# Patient Record
Sex: MALE | Race: WHITE | Employment: OTHER | URBAN - METROPOLITAN AREA
[De-identification: names, ages, dates, MRNs, and addresses within clinical notes are randomized per-mention and may not be internally consistent; named-entity substitution may affect disease eponyms.]

---

## 2018-10-12 ENCOUNTER — APPOINTMENT (OUTPATIENT)
Dept: CT IMAGING | Age: 83
DRG: 871 | End: 2018-10-12
Attending: STUDENT IN AN ORGANIZED HEALTH CARE EDUCATION/TRAINING PROGRAM
Payer: MEDICARE

## 2018-10-12 ENCOUNTER — HOSPITAL ENCOUNTER (INPATIENT)
Age: 83
LOS: 4 days | Discharge: NURSING, REHAB OR LONG-TERM CARE | DRG: 871 | End: 2018-10-17
Attending: EMERGENCY MEDICINE | Admitting: INTERNAL MEDICINE
Payer: MEDICARE

## 2018-10-12 ENCOUNTER — APPOINTMENT (OUTPATIENT)
Dept: GENERAL RADIOLOGY | Age: 83
DRG: 871 | End: 2018-10-12
Attending: STUDENT IN AN ORGANIZED HEALTH CARE EDUCATION/TRAINING PROGRAM
Payer: MEDICARE

## 2018-10-12 DIAGNOSIS — F03.90 DEMENTIA WITHOUT BEHAVIORAL DISTURBANCE, UNSPECIFIED DEMENTIA TYPE: Primary | ICD-10-CM

## 2018-10-12 DIAGNOSIS — R09.02 HYPOXIA: ICD-10-CM

## 2018-10-12 LAB
ALBUMIN SERPL-MCNC: 3.5 G/DL (ref 3.5–5)
ALBUMIN/GLOB SERPL: 0.9 {RATIO} (ref 1.1–2.2)
ALP SERPL-CCNC: 73 U/L (ref 45–117)
ALT SERPL-CCNC: 25 U/L (ref 12–78)
ANION GAP SERPL CALC-SCNC: 5 MMOL/L (ref 5–15)
AST SERPL-CCNC: 19 U/L (ref 15–37)
BASOPHILS # BLD: 0 K/UL (ref 0–0.1)
BASOPHILS NFR BLD: 0 % (ref 0–1)
BILIRUB SERPL-MCNC: 0.6 MG/DL (ref 0.2–1)
BNP SERPL-MCNC: ABNORMAL PG/ML (ref 0–450)
BUN SERPL-MCNC: 42 MG/DL (ref 6–20)
BUN/CREAT SERPL: 33 (ref 12–20)
CALCIUM SERPL-MCNC: 8.8 MG/DL (ref 8.5–10.1)
CHLORIDE SERPL-SCNC: 111 MMOL/L (ref 97–108)
CO2 SERPL-SCNC: 29 MMOL/L (ref 21–32)
CREAT BLD-MCNC: 1.3 MG/DL (ref 0.6–1.3)
CREAT SERPL-MCNC: 1.29 MG/DL (ref 0.7–1.3)
DIFFERENTIAL METHOD BLD: ABNORMAL
EOSINOPHIL # BLD: 0 K/UL (ref 0–0.4)
EOSINOPHIL NFR BLD: 1 % (ref 0–7)
ERYTHROCYTE [DISTWIDTH] IN BLOOD BY AUTOMATED COUNT: 15.8 % (ref 11.5–14.5)
GLOBULIN SER CALC-MCNC: 4.1 G/DL (ref 2–4)
GLUCOSE SERPL-MCNC: 108 MG/DL (ref 65–100)
HCT VFR BLD AUTO: 32.1 % (ref 36.6–50.3)
HGB BLD-MCNC: 10.3 G/DL (ref 12.1–17)
IMM GRANULOCYTES # BLD: 0 K/UL (ref 0–0.04)
IMM GRANULOCYTES NFR BLD AUTO: 0 % (ref 0–0.5)
INR PPP: 1.1 (ref 0.9–1.1)
LACTATE SERPL-SCNC: 1.4 MMOL/L (ref 0.4–2)
LACTATE SERPL-SCNC: 2.1 MMOL/L (ref 0.4–2)
LIPASE SERPL-CCNC: 90 U/L (ref 73–393)
LYMPHOCYTES # BLD: 0.6 K/UL (ref 0.8–3.5)
LYMPHOCYTES NFR BLD: 10 % (ref 12–49)
MAGNESIUM SERPL-MCNC: 2.1 MG/DL (ref 1.6–2.4)
MCH RBC QN AUTO: 30.5 PG (ref 26–34)
MCHC RBC AUTO-ENTMCNC: 32.1 G/DL (ref 30–36.5)
MCV RBC AUTO: 95 FL (ref 80–99)
MONOCYTES # BLD: 0.4 K/UL (ref 0–1)
MONOCYTES NFR BLD: 7 % (ref 5–13)
NEUTS SEG # BLD: 4.8 K/UL (ref 1.8–8)
NEUTS SEG NFR BLD: 82 % (ref 32–75)
NRBC # BLD: 0 K/UL (ref 0–0.01)
NRBC BLD-RTO: 0 PER 100 WBC
PLATELET # BLD AUTO: 181 K/UL (ref 150–400)
PMV BLD AUTO: 11.1 FL (ref 8.9–12.9)
POTASSIUM SERPL-SCNC: 4.9 MMOL/L (ref 3.5–5.1)
PROT SERPL-MCNC: 7.6 G/DL (ref 6.4–8.2)
PROTHROMBIN TIME: 11.5 SEC (ref 9–11.1)
RBC # BLD AUTO: 3.38 M/UL (ref 4.1–5.7)
SODIUM SERPL-SCNC: 145 MMOL/L (ref 136–145)
TROPONIN I SERPL-MCNC: 0.09 NG/ML
WBC # BLD AUTO: 5.9 K/UL (ref 4.1–11.1)

## 2018-10-12 PROCEDURE — 96374 THER/PROPH/DIAG INJ IV PUSH: CPT

## 2018-10-12 PROCEDURE — 74177 CT ABD & PELVIS W/CONTRAST: CPT

## 2018-10-12 PROCEDURE — 83690 ASSAY OF LIPASE: CPT

## 2018-10-12 PROCEDURE — 85610 PROTHROMBIN TIME: CPT

## 2018-10-12 PROCEDURE — 83605 ASSAY OF LACTIC ACID: CPT

## 2018-10-12 PROCEDURE — 83735 ASSAY OF MAGNESIUM: CPT

## 2018-10-12 PROCEDURE — 71275 CT ANGIOGRAPHY CHEST: CPT

## 2018-10-12 PROCEDURE — 80053 COMPREHEN METABOLIC PANEL: CPT

## 2018-10-12 PROCEDURE — 36415 COLL VENOUS BLD VENIPUNCTURE: CPT

## 2018-10-12 PROCEDURE — 83880 ASSAY OF NATRIURETIC PEPTIDE: CPT

## 2018-10-12 PROCEDURE — 85025 COMPLETE CBC W/AUTO DIFF WBC: CPT

## 2018-10-12 PROCEDURE — 84484 ASSAY OF TROPONIN QUANT: CPT

## 2018-10-12 PROCEDURE — 82565 ASSAY OF CREATININE: CPT

## 2018-10-12 PROCEDURE — 83605 ASSAY OF LACTIC ACID: CPT | Performed by: EMERGENCY MEDICINE

## 2018-10-12 PROCEDURE — 87040 BLOOD CULTURE FOR BACTERIA: CPT

## 2018-10-12 PROCEDURE — 93005 ELECTROCARDIOGRAM TRACING: CPT

## 2018-10-12 PROCEDURE — 74011636320 HC RX REV CODE- 636/320: Performed by: EMERGENCY MEDICINE

## 2018-10-12 PROCEDURE — 99285 EMERGENCY DEPT VISIT HI MDM: CPT

## 2018-10-12 PROCEDURE — 74011250636 HC RX REV CODE- 250/636: Performed by: EMERGENCY MEDICINE

## 2018-10-12 PROCEDURE — 71045 X-RAY EXAM CHEST 1 VIEW: CPT

## 2018-10-12 RX ORDER — LORAZEPAM 2 MG/ML
1 INJECTION INTRAMUSCULAR
Status: COMPLETED | OUTPATIENT
Start: 2018-10-12 | End: 2018-10-12

## 2018-10-12 RX ORDER — SODIUM CHLORIDE 9 MG/ML
500 INJECTION, SOLUTION INTRAVENOUS ONCE
Status: DISPENSED | OUTPATIENT
Start: 2018-10-12 | End: 2018-10-13

## 2018-10-12 RX ORDER — SODIUM CHLORIDE 0.9 % (FLUSH) 0.9 %
10 SYRINGE (ML) INJECTION
Status: DISPENSED | OUTPATIENT
Start: 2018-10-12 | End: 2018-10-13

## 2018-10-12 RX ORDER — SODIUM CHLORIDE 9 MG/ML
50 INJECTION, SOLUTION INTRAVENOUS
Status: COMPLETED | OUTPATIENT
Start: 2018-10-12 | End: 2018-10-12

## 2018-10-12 RX ORDER — LORAZEPAM 2 MG/ML
INJECTION INTRAMUSCULAR
Status: DISPENSED
Start: 2018-10-12 | End: 2018-10-13

## 2018-10-12 RX ADMIN — IOPAMIDOL 100 ML: 755 INJECTION, SOLUTION INTRAVENOUS at 21:00

## 2018-10-12 RX ADMIN — SODIUM CHLORIDE 50 ML/HR: 900 INJECTION, SOLUTION INTRAVENOUS at 21:00

## 2018-10-12 RX ADMIN — LORAZEPAM 1 MG: 2 INJECTION INTRAMUSCULAR; INTRAVENOUS at 20:09

## 2018-10-12 NOTE — ED PROVIDER NOTES
Patient Name: Gloria Olson History of Presenting Illness Chief Complaint Patient presents with  Shortness of Breath  
  patient is here with EMS from the [facility] home per EMS they sent him here because he had an abnormal chest xray patient is confused at baseline. patient was placed on Oxygen at SNF. patient is a poor historian History Provided By: Patient's Daughter and EMS 
 
HPI: Gloria Olson, 80 y.o. male with PMHx significant for CAD, HTN, Alzheimer's disease, presents via EMS to the ED with cc of increased work of breathing and new oxygen requirement. Pt's living facility conducted a cxray that showed a right sided inflammatory infiltrate. Pt is obtunded at baseline and unable to participate in history or physical examination. Daughter reports that he is near his mental baseline. She is unable to give any other details of his medical history. HPI and ROS is limited due to dementia. PCP: María Canales MD 
 
Current Facility-Administered Medications Medication Dose Route Frequency Provider Last Rate Last Dose  
 0.9% sodium chloride infusion 500 mL  500 mL IntraVENous ONCE Rio Donovan, DO      
 sodium chloride (NS) flush 10 mL  10 mL IntraVENous RAD ONCE Adan Nguyen, DO      
 LORazepam (ATIVAN) 2 mg/mL injection Current Outpatient Prescriptions Medication Sig Dispense Refill  citalopram (CELEXA) 20 mg tablet Take 20 mg by mouth daily.  amLODIPine (NORVASC) 5 mg tablet Take 1 Tab by mouth daily. 30 Tab 5  clopidogrel (PLAVIX) 75 mg tablet Take 1 Tab by mouth daily. 30 Tab 5  
 memantine (NAMENDA) 10 mg tablet Take 10 mg by mouth two (2) times a day.  fenofibrate (TRICOR) 54 mg tablet Take 160 mg by mouth daily.  cilostazol (PLETAL) 100 mg tablet Take 100 mg by mouth Before breakfast and dinner.  NPH, HUMAN INSULIN ISOPHANE (HUMULIN N SC) 30 Units by SubCUTAneous route two (2) times a day.  donepezil (ARICEPT) 10 mg tablet Take 10 mg by mouth daily.  lisinopril (PRINIVIL, ZESTRIL) 20 mg tablet Take 20 mg by mouth daily.  colesevelam (WELCHOL) 625 mg tablet Take 1,875 mg by mouth two (2) times a day. Past History Past Medical History: 
Past Medical History:  
Diagnosis Date  CAD (coronary artery disease) 2000 MI  Coagulation defects   
 bruises easily  COPD  DEMENTIA  Diabetes ([address] IDDM  Hypertension  Other ill-defined conditions(799.89)   
 increased cholesterol  Unspecified cerebral artery occlusion with cerebral infarction ([facility] [address] Past Surgical History: 
Past Surgical History:  
Procedure Laterality Date  CARDIAC SURG PROCEDURE UNLIST  2005  
 cardiac cath - heart stent x ?3  HX APPENDECTOMY  HX TONSILLECTOMY  HX UROLOGICAL  1990  
 prostate - removal of polyp Family History: 
Family History Problem Relation Age of Onset  Cancer Sister   
  breast  
 Cancer Brother   
  brain  Lung Disease Father Social History: 
Social History Substance Use Topics  Smoking status: Former Smoker  Smokeless tobacco: Not on file Comment: former pipe/cigar smoker - quit 25 yrs ago  Alcohol use No  
 
 
Allergies: Allergies Allergen Reactions  Macrolide Antibiotics Unknown (comments)  Statins-Hmg-Coa Reductase Inhibitors Myalgia  Vitamin B12 With Intrinsic Factor Other (comments)  Zetia [Ezetimibe] Shortness of Breath Review of Systems Review of Systems Unable to perform ROS: Dementia (due to pt mental status) Physical Exam  
Physical Exam  
Constitutional:  
Cachectic appearing male patient laying in fetal position on his left side HENT:  
Head: Normocephalic and atraumatic. Eyes: Pupils are equal, round, and reactive to light. Neck: Normal range of motion. Neck supple. Cardiovascular: Intact distal pulses. Exam reveals no gallop and no friction rub. No murmur heard. Tachycardic, S1, S2  
Pulmonary/Chest:  
Tachypnic, decreased air movement on the right side, no rales or wheezes Abdominal: Soft. Diffuse tenderness, some voluntary guarding Neurological:  
Opens eyes to verbal, does not follow commands due to mental status, moves all extremities purposefully, responds to pain in his abdomen Skin:  
Warm, dry, pale Diagnostic Study Results Labs - Recent Results (from the past 12 hour(s)) LACTIC ACID Collection Time: 10/12/18  5:38 PM  
Result Value Ref Range Lactic acid 2.1 (HH) 0.4 - 2.0 MMOL/L  
CBC WITH AUTOMATED DIFF Collection Time: 10/12/18  5:38 PM  
Result Value Ref Range WBC 5.9 4.1 - 11.1 K/uL  
 RBC 3.38 (L) 4.10 - 5.70 M/uL  
 HGB 10.3 (L) 12.1 - 17.0 g/dL HCT 32.1 (L) 36.6 - 50.3 % MCV 95.0 80.0 - 99.0 FL  
 MCH 30.5 26.0 - 34.0 PG  
 MCHC 32.1 30.0 - 36.5 g/dL  
 RDW 15.8 (H) 11.5 - 14.5 % PLATELET 098 330 - 502 K/uL MPV 11.1 8.9 - 12.9 FL  
 NRBC 0.0 0  WBC ABSOLUTE NRBC 0.00 0.00 - 0.01 K/uL NEUTROPHILS 82 (H) 32 - 75 % LYMPHOCYTES 10 (L) 12 - 49 % MONOCYTES 7 5 - 13 % EOSINOPHILS 1 0 - 7 % BASOPHILS 0 0 - 1 % IMMATURE GRANULOCYTES 0 0.0 - 0.5 % ABS. NEUTROPHILS 4.8 1.8 - 8.0 K/UL  
 ABS. LYMPHOCYTES 0.6 (L) 0.8 - 3.5 K/UL  
 ABS. MONOCYTES 0.4 0.0 - 1.0 K/UL  
 ABS. EOSINOPHILS 0.0 0.0 - 0.4 K/UL  
 ABS. BASOPHILS 0.0 0.0 - 0.1 K/UL  
 ABS. IMM. GRANS. 0.0 0.00 - 0.04 K/UL  
 DF AUTOMATED PROTHROMBIN TIME + INR Collection Time: 10/12/18  5:38 PM  
Result Value Ref Range INR 1.1 0.9 - 1.1 Prothrombin time 11.5 (H) 9.0 - 11.1 sec METABOLIC PANEL, COMPREHENSIVE Collection Time: 10/12/18  5:38 PM  
Result Value Ref Range Sodium 145 136 - 145 mmol/L Potassium 4.9 3.5 - 5.1 mmol/L Chloride 111 (H) 97 - 108 mmol/L  
 CO2 29 21 - 32 mmol/L Anion gap 5 5 - 15 mmol/L Glucose 108 (H) 65 - 100 mg/dL  BUN 42 (H) 6 - 20 MG/DL  
 Creatinine 1.29 0.70 - 1.30 MG/DL  
 BUN/Creatinine ratio 33 (H) 12 - 20 GFR est AA >60 >60 ml/min/1.73m2 GFR est non-AA 53 (L) >60 ml/min/1.73m2 Calcium 8.8 8.5 - 10.1 MG/DL Bilirubin, total 0.6 0.2 - 1.0 MG/DL  
 ALT (SGPT) 25 12 - 78 U/L  
 AST (SGOT) 19 15 - 37 U/L Alk. phosphatase 73 45 - 117 U/L Protein, total 7.6 6.4 - 8.2 g/dL Albumin 3.5 3.5 - 5.0 g/dL Globulin 4.1 (H) 2.0 - 4.0 g/dL A-G Ratio 0.9 (L) 1.1 - 2.2 NT-PRO BNP Collection Time: 10/12/18  5:38 PM  
Result Value Ref Range NT pro-BNP 39988 (H) 0 - 450 PG/ML  
TROPONIN I Collection Time: 10/12/18  5:38 PM  
Result Value Ref Range Troponin-I, Qt. 0.09 (H) <0.05 ng/mL LIPASE Collection Time: 10/12/18  5:38 PM  
Result Value Ref Range Lipase 90 73 - 393 U/L MAGNESIUM Collection Time: 10/12/18  5:38 PM  
Result Value Ref Range Magnesium 2.1 1.6 - 2.4 mg/dL POC CREATININE Collection Time: 10/12/18  5:52 PM  
Result Value Ref Range Creatinine (POC) 1.3 0.6 - 1.3 mg/dL GFRAA, POC >60 >60 ml/min/1.73m2 GFRNA, POC 52 (L) >60 ml/min/1.73m2 EKG, 12 LEAD, INITIAL Collection Time: 10/12/18  6:46 PM  
Result Value Ref Range Ventricular Rate 112 BPM  
 Atrial Rate 224 BPM  
 QRS Duration 130 ms  
 Q-T Interval 368 ms QTC Calculation (Bezet) 502 ms Calculated P Axis -171 degrees Calculated R Axis -55 degrees Calculated T Axis 178 degrees Diagnosis Atrial flutter with 2:1 AV conduction Right bundle branch block Left anterior fascicular block ** Bifascicular block ** Left ventricular hypertrophy with repolarization abnormality Cannot rule out Septal infarct , age undetermined When compared with ECG of 24-JUL-2015 17:38, Atrial flutter has replaced Sinus rhythm Vent. rate has increased BY  39 BPM 
Right bundle branch block has replaced Nonspecific intraventricular block LACTIC ACID Collection Time: 10/12/18  9:28 PM  
Result Value Ref Range Lactic acid 1.4 0.4 - 2.0 MMOL/L Radiologic Studies -  
CT ABD PELV W CONT Final Result CTA CHEST W OR W WO CONT Final Result XR CHEST PORT Final Result CT Results  (Last 48 hours) 10/12/18 2100  CT ABD PELV W CONT Final result Impression:  IMPRESSION:  
No evidence of acute pulmonary embolus. Small bilateral pleural effusions, right  
greater than left, with some associated pleural enhancement; superimposed  
infection is not excluded. Airspace disease in the right lung may represent  
atelectasis or pneumonia. Probable mild pulmonary edema and bilateral dependent  
atelectasis. Cardiomegaly with extensive coronary artery calcifications. Cholecystolithiasis, without CT evidence of acute cholecystitis. Large amount  
stool in the distal colon and rectum. Small pelvic free fluid. Enlarged prostate  
gland. Narrative:  INDICATION: Acute chest pain. Abdominal pain. COMPARISON:None TECHNIQUE:    
Routine noncontrast imaging the chest was performed for localization purposes. Then, following the uneventful intravenous administration of 100 cc [id number],  
thin helical axial images were obtained through the chest. 3D image  
postprocessing was performed. Thin axial images were also obtained through the  
abdomen and pelvis. Coronal and sagittal reconstructions were generated. Oral  
contrast was not administered. CT dose reduction was achieved through use of a  
standardized protocol tailored for this examination and automatic exposure  
control for dose modulation. FINDINGS:  
   
THYROID: No nodule. MEDIASTINUM: No mass or lymphadenopathy. ALLEN: No mass or lymphadenopathy. THORACIC AORTA: No dissection or aneurysm. PULMONARY ARTERIES: Main pulmonary artery is normal in caliber. No evidence of  
acute pulmonary emboli. TRACHEA/BRONCHI: Patent. ESOPHAGUS: No wall thickening or dilatation. HEART: Enlarged. Extensive coronary artery calcifications. PLEURA: Small bilateral pleural effusions, right greater than left. There is  
some associated pleural enhancement bilaterally. LUNGS: Peripheral consolidation in the right upper and lower lobes. Probable  
mild pulmonary edema. Bilateral dependent atelectasis. LIVER: No mass or biliary dilatation. GALLBLADDER: Distended. Contains small calculi. No wall thickening. SPLEEN: No mass. PANCREAS: No mass or ductal dilatation. ADRENALS: Unremarkable. KIDNEYS: No calculus or hydronephrosis. Bilateral renal cysts. STOMACH: Unremarkable. SMALL BOWEL: No dilatation or wall thickening. COLON: No dilatation or wall thickening. Large amount stool in the distal colon  
and rectum. APPENDIX: Surgically absent. PERITONEUM: Small pelvic free fluid. No pneumoperitoneum. RETROPERITONEUM: No lymphadenopathy or aortic aneurysm. REPRODUCTIVE ORGANS: Enlarged prostate gland. URINARY BLADDER: No mass or calculus. BONES: No destructive bone lesion. ADDITIONAL COMMENTS: N/A  
   
  
 10/12/18 2100  CTA CHEST W OR W WO CONT Final result Impression:  IMPRESSION:  
No evidence of acute pulmonary embolus. Small bilateral pleural effusions, right  
greater than left, with some associated pleural enhancement; superimposed  
infection is not excluded. Airspace disease in the right lung may represent  
atelectasis or pneumonia. Probable mild pulmonary edema and bilateral dependent  
atelectasis. Cardiomegaly with extensive coronary artery calcifications. Cholecystolithiasis, without CT evidence of acute cholecystitis. Large amount  
stool in the distal colon and rectum. Small pelvic free fluid. Enlarged prostate  
gland. Narrative:  INDICATION: Acute chest pain. Abdominal pain. COMPARISON:None TECHNIQUE:    
Routine noncontrast imaging the chest was performed for localization purposes. Then, following the uneventful intravenous administration of 100 cc [id number],  
thin helical axial images were obtained through the chest. 3D image  
postprocessing was performed. Thin axial images were also obtained through the  
abdomen and pelvis. Coronal and sagittal reconstructions were generated. Oral  
contrast was not administered. CT dose reduction was achieved through use of a  
standardized protocol tailored for this examination and automatic exposure  
control for dose modulation. FINDINGS:  
   
THYROID: No nodule. MEDIASTINUM: No mass or lymphadenopathy. ALLEN: No mass or lymphadenopathy. THORACIC AORTA: No dissection or aneurysm. PULMONARY ARTERIES: Main pulmonary artery is normal in caliber. No evidence of  
acute pulmonary emboli. TRACHEA/BRONCHI: Patent. ESOPHAGUS: No wall thickening or dilatation. HEART: Enlarged. Extensive coronary artery calcifications. PLEURA: Small bilateral pleural effusions, right greater than left. There is  
some associated pleural enhancement bilaterally. LUNGS: Peripheral consolidation in the right upper and lower lobes. Probable  
mild pulmonary edema. Bilateral dependent atelectasis. LIVER: No mass or biliary dilatation. GALLBLADDER: Distended. Contains small calculi. No wall thickening. SPLEEN: No mass. PANCREAS: No mass or ductal dilatation. ADRENALS: Unremarkable. KIDNEYS: No calculus or hydronephrosis. Bilateral renal cysts. STOMACH: Unremarkable. SMALL BOWEL: No dilatation or wall thickening. COLON: No dilatation or wall thickening. Large amount stool in the distal colon  
and rectum. APPENDIX: Surgically absent. PERITONEUM: Small pelvic free fluid. No pneumoperitoneum. RETROPERITONEUM: No lymphadenopathy or aortic aneurysm. REPRODUCTIVE ORGANS: Enlarged prostate gland. URINARY BLADDER: No mass or calculus. BONES: No destructive bone lesion.   
ADDITIONAL COMMENTS: N/A  
   
  
  
 
 CXR Results  (Last 48 hours) 10/12/18 1845  XR CHEST PORT Final result Impression:  IMPRESSION: Cardiomegaly with near complete opacification of the right lung,  
suggestive of large effusion with atelectasis. Mild pulmonary edema. Small left  
pleural effusion. Narrative:  INDICATION: Altered mental status. Sepsis workup. COMPARISON: July 24, 2015 FINDINGS: AP portable imaging of the chest performed at 6:38 PM demonstrates  
moderate cardiomegaly. There is new near complete opacification of the right  
hemithorax. Mild pulmonary edema is seen in the left lung. There is a small left  
pleural effusion. No significant osseous abnormalities are seen. Medical Decision Making I am the first provider for this patient. I reviewed the vital signs, available nursing notes, past medical history, past surgical history, family history and social history. Vital Signs-Reviewed the patient's vital signs. Patient Vitals for the past 12 hrs: 
 Temp Pulse Resp BP SpO2  
10/12/18 2018 - (!) 111 (!) 31 - 94 % 10/12/18 2010 - (!) 119 26 (!) 121/93 -  
10/12/18 1930 - (!) 111 22 107/76 96 % 10/12/18 1741 99.2 °F (37.3 °C) - - - -  
10/12/18 1717 98.7 °F (37.1 °C) (!) 110 20 (!) 84/58 95 % Pulse Oximetry Analysis - 95% on 2 lpm 
 
Cardiac Monitor:  
Rate: 110 bpm 
Rhythm: Sinus Tachycardia EKG interpretation: (Preliminary) 18:46 Rhythm: atrial flutter with 2:1 AV conduction; Rate (approx.): 112; Axis: left axis deviation; QRS interval: normal ; Other findings: right BBB; bifascicular block; left ventricular hypertrophy with repolarization abnormality Written by KATELYN Green, as dictated by Lisette John DO. Records Reviewed: Nursing Notes, Old Medical Records and Previous electrocardiograms Provider Notes (Medical Decision Making): DDx: PE vs Sepsis vs Pneumonia vs Abdominal infeciton vs UTI Will conduct CTA of Chest and CT of abdomen. Pan culture and labs to evaluate for ischemia or metabolic derrangement ED Course:  
Initial assessment performed. The patients presenting problems have been discussed, and they are in agreement with the care plan formulated and outlined with them. I have encouraged them to ask questions as they arise throughout their visit. CONSULT NOTE:  
11:10 PM 
Aram Leo MD spoke with Dr. Ranjit Tran, Specialty: Hospitalist 
Discussed pt's hx, disposition, and available diagnostic and imaging results. Reviewed care plans. Consultant will evaluate pt for admission. Written by Naina Kline, ED Scribe, as dictated by Aram Leo MD. Disposition: 
Admit Note: 
11:10 PM 
Pt is being admitted by dr. Ranjit Tran. The results of their tests and reason(s) for their admission have been discussed with pt and/or available family. They convey agreement and understanding for the need to be admitted and for admission diagnosis. PLAN: 
1. Admission to the Hospitalist  
 
Diagnosis Clinical Impression: No diagnosis found. Attestations: This note is prepared by Hellen Jara, acting as Scribe for Kyle Valdez DO and Dr. Betty Davis. The scribe's documentation has been prepared under my direction and personally reviewed by me in its entirety. I confirm that the note above accurately reflects all work, treatment, procedures, and medical decision making performed by me. Kyle Valdez DO, resident Betty Davis DO 
 
 
 
8:46 PM 
I have personally seen and examined the patient, reviewed the ALYSON's note and agree with findings and plan. The patient presents with:  Hypoxia, tachycardia My exam shows:  
 
General: NAD HEENT: EOMI, non-icteric sclera Chest: tachycardic, no m/r/g Lungs: Decreased breath sounds in R chest, tachypneic Abd: mild tenderness in the RLQ, nd, soft, +bs Ext: no peripheral edema, no cyanosis, +2 peripheral pulses Skin: no rashes or lesions Neuro: Lethargic, unable to follow commands. Opens eyes spontaneously. Impression/Plan:  
 
I have reviewed and agree with the MLP's findings, including all diagnostic interpretations, and plans as written. I was present during the key portions of separately billed procedures.   
Donal Rosalee, DO

## 2018-10-13 PROBLEM — Z86.73 H/O: CVA (CEREBROVASCULAR ACCIDENT): Status: ACTIVE | Noted: 2018-10-13

## 2018-10-13 PROBLEM — A41.9 SEPSIS (HCC): Status: ACTIVE | Noted: 2018-10-13

## 2018-10-13 LAB
ANION GAP SERPL CALC-SCNC: 14 MMOL/L (ref 5–15)
BUN SERPL-MCNC: 43 MG/DL (ref 6–20)
BUN/CREAT SERPL: 33 (ref 12–20)
CALCIUM SERPL-MCNC: 8.4 MG/DL (ref 8.5–10.1)
CHLORIDE SERPL-SCNC: 111 MMOL/L (ref 97–108)
CO2 SERPL-SCNC: 22 MMOL/L (ref 21–32)
COMMENT, HOLDF: NORMAL
CREAT SERPL-MCNC: 1.29 MG/DL (ref 0.7–1.3)
GLUCOSE BLD STRIP.AUTO-MCNC: 107 MG/DL (ref 65–100)
GLUCOSE BLD STRIP.AUTO-MCNC: 122 MG/DL (ref 65–100)
GLUCOSE BLD STRIP.AUTO-MCNC: 125 MG/DL (ref 65–100)
GLUCOSE BLD STRIP.AUTO-MCNC: 125 MG/DL (ref 65–100)
GLUCOSE BLD STRIP.AUTO-MCNC: 139 MG/DL (ref 65–100)
GLUCOSE SERPL-MCNC: 113 MG/DL (ref 65–100)
LDH SERPL L TO P-CCNC: 175 U/L (ref 85–241)
POTASSIUM SERPL-SCNC: 4.5 MMOL/L (ref 3.5–5.1)
PROT SERPL-MCNC: 7.1 G/DL (ref 6.4–8.2)
SAMPLES BEING HELD,HOLD: NORMAL
SERVICE CMNT-IMP: ABNORMAL
SODIUM SERPL-SCNC: 147 MMOL/L (ref 136–145)

## 2018-10-13 PROCEDURE — 82962 GLUCOSE BLOOD TEST: CPT

## 2018-10-13 PROCEDURE — 84155 ASSAY OF PROTEIN SERUM: CPT | Performed by: INTERNAL MEDICINE

## 2018-10-13 PROCEDURE — 36415 COLL VENOUS BLD VENIPUNCTURE: CPT | Performed by: INTERNAL MEDICINE

## 2018-10-13 PROCEDURE — 74011000258 HC RX REV CODE- 258: Performed by: INTERNAL MEDICINE

## 2018-10-13 PROCEDURE — 93005 ELECTROCARDIOGRAM TRACING: CPT

## 2018-10-13 PROCEDURE — 74011250636 HC RX REV CODE- 250/636: Performed by: INTERNAL MEDICINE

## 2018-10-13 PROCEDURE — 83615 LACTATE (LD) (LDH) ENZYME: CPT | Performed by: INTERNAL MEDICINE

## 2018-10-13 PROCEDURE — 77010033678 HC OXYGEN DAILY

## 2018-10-13 PROCEDURE — 77030011943

## 2018-10-13 PROCEDURE — 93306 TTE W/DOPPLER COMPLETE: CPT

## 2018-10-13 PROCEDURE — 80048 BASIC METABOLIC PNL TOTAL CA: CPT | Performed by: INTERNAL MEDICINE

## 2018-10-13 PROCEDURE — 74011250637 HC RX REV CODE- 250/637: Performed by: INTERNAL MEDICINE

## 2018-10-13 PROCEDURE — 65660000000 HC RM CCU STEPDOWN

## 2018-10-13 RX ORDER — MEMANTINE HYDROCHLORIDE 10 MG/1
10 TABLET ORAL 2 TIMES DAILY
Status: DISCONTINUED | OUTPATIENT
Start: 2018-10-13 | End: 2018-10-17 | Stop reason: HOSPADM

## 2018-10-13 RX ORDER — DONEPEZIL HYDROCHLORIDE 5 MG/1
10 TABLET, FILM COATED ORAL DAILY
Status: DISCONTINUED | OUTPATIENT
Start: 2018-10-13 | End: 2018-10-17 | Stop reason: HOSPADM

## 2018-10-13 RX ORDER — SODIUM CHLORIDE 9 MG/ML
75 INJECTION, SOLUTION INTRAVENOUS CONTINUOUS
Status: DISCONTINUED | OUTPATIENT
Start: 2018-10-13 | End: 2018-10-13

## 2018-10-13 RX ORDER — CLOPIDOGREL BISULFATE 75 MG/1
75 TABLET ORAL DAILY
Status: DISCONTINUED | OUTPATIENT
Start: 2018-10-14 | End: 2018-10-17 | Stop reason: HOSPADM

## 2018-10-13 RX ORDER — VANCOMYCIN/0.9 % SOD CHLORIDE 1.5G/250ML
1500 PLASTIC BAG, INJECTION (ML) INTRAVENOUS ONCE
Status: COMPLETED | OUTPATIENT
Start: 2018-10-13 | End: 2018-10-13

## 2018-10-13 RX ORDER — HEPARIN SODIUM 5000 [USP'U]/ML
5000 INJECTION, SOLUTION INTRAVENOUS; SUBCUTANEOUS EVERY 12 HOURS
Status: DISCONTINUED | OUTPATIENT
Start: 2018-10-13 | End: 2018-10-15

## 2018-10-13 RX ORDER — CILOSTAZOL 100 MG/1
100 TABLET ORAL
Status: DISCONTINUED | OUTPATIENT
Start: 2018-10-13 | End: 2018-10-17 | Stop reason: HOSPADM

## 2018-10-13 RX ORDER — CITALOPRAM 20 MG/1
20 TABLET, FILM COATED ORAL DAILY
Status: DISCONTINUED | OUTPATIENT
Start: 2018-10-13 | End: 2018-10-17 | Stop reason: HOSPADM

## 2018-10-13 RX ORDER — DEXTROSE 50 % IN WATER (D50W) INTRAVENOUS SYRINGE
25-50 AS NEEDED
Status: DISCONTINUED | OUTPATIENT
Start: 2018-10-13 | End: 2018-10-17 | Stop reason: HOSPADM

## 2018-10-13 RX ORDER — MAGNESIUM SULFATE 100 %
4 CRYSTALS MISCELLANEOUS AS NEEDED
Status: DISCONTINUED | OUTPATIENT
Start: 2018-10-13 | End: 2018-10-17 | Stop reason: HOSPADM

## 2018-10-13 RX ORDER — FENOFIBRATE 48 MG/1
48 TABLET, COATED ORAL DAILY
Status: DISCONTINUED | OUTPATIENT
Start: 2018-10-13 | End: 2018-10-17 | Stop reason: HOSPADM

## 2018-10-13 RX ORDER — INSULIN LISPRO 100 [IU]/ML
INJECTION, SOLUTION INTRAVENOUS; SUBCUTANEOUS
Status: DISCONTINUED | OUTPATIENT
Start: 2018-10-13 | End: 2018-10-17 | Stop reason: HOSPADM

## 2018-10-13 RX ORDER — POLYETHYLENE GLYCOL 3350 17 G/17G
17 POWDER, FOR SOLUTION ORAL DAILY
Status: DISCONTINUED | OUTPATIENT
Start: 2018-10-13 | End: 2018-10-17 | Stop reason: HOSPADM

## 2018-10-13 RX ADMIN — MEMANTINE 10 MG: 10 TABLET ORAL at 21:18

## 2018-10-13 RX ADMIN — CILOSTAZOL 100 MG: 100 TABLET ORAL at 17:25

## 2018-10-13 RX ADMIN — DONEPEZIL HYDROCHLORIDE 10 MG: 5 TABLET, FILM COATED ORAL at 10:11

## 2018-10-13 RX ADMIN — PIPERACILLIN SODIUM,TAZOBACTAM SODIUM 3.38 G: 3; .375 INJECTION, POWDER, FOR SOLUTION INTRAVENOUS at 10:09

## 2018-10-13 RX ADMIN — PIPERACILLIN SODIUM,TAZOBACTAM SODIUM 3.38 G: 3; .375 INJECTION, POWDER, FOR SOLUTION INTRAVENOUS at 02:42

## 2018-10-13 RX ADMIN — MEMANTINE 10 MG: 10 TABLET ORAL at 10:12

## 2018-10-13 RX ADMIN — PIPERACILLIN SODIUM,TAZOBACTAM SODIUM 4.5 G: 4; .5 INJECTION, POWDER, FOR SOLUTION INTRAVENOUS at 17:25

## 2018-10-13 RX ADMIN — VANCOMYCIN HYDROCHLORIDE 1500 MG: 10 INJECTION, POWDER, LYOPHILIZED, FOR SOLUTION INTRAVENOUS at 04:05

## 2018-10-13 RX ADMIN — CITALOPRAM HYDROBROMIDE 20 MG: 20 TABLET ORAL at 10:12

## 2018-10-13 RX ADMIN — VANCOMYCIN HYDROCHLORIDE 1000 MG: 1 INJECTION, POWDER, LYOPHILIZED, FOR SOLUTION INTRAVENOUS at 21:19

## 2018-10-13 RX ADMIN — HEPARIN SODIUM 5000 UNITS: 5000 INJECTION INTRAVENOUS; SUBCUTANEOUS at 10:12

## 2018-10-13 RX ADMIN — FENOFIBRATE 48 MG: 48 TABLET ORAL at 10:12

## 2018-10-13 RX ADMIN — HEPARIN SODIUM 5000 UNITS: 5000 INJECTION INTRAVENOUS; SUBCUTANEOUS at 21:19

## 2018-10-13 RX ADMIN — CILOSTAZOL 100 MG: 100 TABLET ORAL at 07:30

## 2018-10-13 NOTE — PROGRESS NOTES
Theodore score 13. Small non blanchable area noted to sacrum. A Mepilex applied. Dark purple bruising to all extremities. Left inner knee red, a mepilex applied. Scattered reddened areas to bilateral toes.   Dual skin assessment done with Colton Morgan RN

## 2018-10-13 NOTE — PROGRESS NOTES
Hospitalist Progress Note NAME: Mathew Crews :  1931 MRN:  [id number] Assessment / Plan: 
Acute hypoxic respiratory failure Suspected Severe Sepsis from Pneumonia: can not rule out HCAP Bilateral Pleural Effusions; R>L Elevated Lactic Acid: 2.1; improved 
-CXR: cardiomegaly w near complete opacification of Rt lung, suggestive of large effusion with atelectasis, mild pulm edema 
-CTA chest: no PE, airspace Dz Rt lung may represent atelectasis or PNA 
-patient currently on 2L NC 
-IVF's stopped with concern for pulmonary edema 
-agree with TTE 
-cycle serial cardiac biomarkers 
-case discussed with Dr. Bakari Rosenthal this am, will await formal recommendations 
-thoracentesis/pleural drain placed on hold today as not emergent, will continue IV antibiotics and diurese as tolerated Hypotension on presentation: BP was 84/58 
-improved; pending BP's later today will consider IV diuretic Atrial flutter with RV: new? Daughter thinks patient may have been told he has an arhythmia but unsure 
-await Cardiology impression and check TFT's 
-otherwise will cycle troponin's and get TTE as above 
  
DM2 
-continue SSI and check A1c 
  
CAD 
HTN 
PAD 
-hold home antihypertensives with BP trends 
-cont plavix, cilostazol 
  
Advanced Alzheimer's dementia 
-cont namenda, aricept 
  
Constipation 
-miralax, soap suds enema 
  
Code Status: DNR Surrogate Decision Maker: Daughter 
  
DVT Prophylaxis: sq heparin GI Prophylaxis: not indicated 
  
Baseline: Mostly Wheelchair bound at [facility] Subjective: Chief Complaint / Reason for Physician Visit: follow-up respiratory failure Patient sedated Restless all night and has sitter at bedside Case discussed with RN Review of Systems: 
Symptom Y/N Comments  Symptom Y/N Comments Fever/Chills    Chest Pain Poor Appetite    Edema Cough    Abdominal Pain Sputum    Joint Pain SOB/GORDILLO    Pruritis/Rash Nausea/vomit    Tolerating PT/OT Diarrhea    Tolerating Diet Constipation    Other Could NOT obtain due to: sedated Objective: VITALS:  
Last 24hrs VS reviewed since prior progress note. Most recent are: 
Patient Vitals for the past 24 hrs: 
 Temp Pulse Resp BP SpO2  
10/13/18 0640 - - - - 95 % 10/13/18 0403 - - - - 95 % 10/13/18 0355 97.2 °F (36.2 °C) (!) 109 22 111/73 96 % 10/13/18 0200 - (!) 110 28 104/74 94 % 10/12/18 2018 - (!) 111 (!) 31 - 94 % 10/12/18 2010 - (!) 119 26 (!) 121/93 -  
10/12/18 1930 - (!) 111 22 107/76 96 % 10/12/18 1741 99.2 °F (37.3 °C) - - - -  
10/12/18 1717 98.7 °F (37.1 °C) (!) 110 20 (!) 84/58 95 % No intake or output data in the 24 hours ending 10/13/18 0901 PHYSICAL EXAM: 
General: WD, Elderly. Sedated, no acute distress   
EENT:  Pupils reactive. Anicteric sclerae. MM dry Resp:  Mild wheezing. No accessory muscle use CV:  Regular  rhythm,  No significant edema GI:  Soft, Non distended, Non tender.  +Bowel sounds Neurologic:  Sedated Psych:   Unable to assess insight at this time. Not anxious nor agitated Skin:  No rashes noted. No jaundice Reviewed most current lab test results and cultures  YES Reviewed most current radiology test results   YES Review and summation of old records today    NO Reviewed patient's current orders and MAR    YES 
PMH/ reviewed - no change compared to H&P 
________________________________________________________________________ Care Plan discussed with: 
  Comments Patient Family RN x Care Manager Consultant  x Radiology and Dr. Allyson Hernandez Multidiciplinary team rounds were held today with , nursing, pharmacist and clinical coordinator. Patient's plan of care was discussed; medications were reviewed and discharge planning was addressed. ________________________________________________________________________ Total NON critical care TIME:  30   Minutes Total CRITICAL CARE TIME Spent:   Minutes non procedure based Comments >50% of visit spent in counseling and coordination of care x   
________________________________________________________________________ Melvin Riley MD  
 
Procedures: see electronic medical records for all procedures/Xrays and details which were not copied into this note but were reviewed prior to creation of Plan. LABS: 
I reviewed today's most current labs and imaging studies. Pertinent labs include: 
Recent Labs 10/12/18 
 1738 WBC  5.9 HGB  10.3* HCT  32.1*  
PLT  181 Recent Labs 10/12/18 
 1738 NA  145  
K  4.9 CL  111* CO2  29 GLU  108* BUN  42* CREA  1.29  
CA  8.8 MG  2.1 ALB  3.5 TBILI  0.6 SGOT  19 ALT  25 INR  1.1 Signed: Melvin Riley MD

## 2018-10-13 NOTE — ROUTINE PROCESS
TRANSFER - OUT REPORT: 
 
Verbal report given to RN(name) on Maria Luisa Lott  being transferred to PCU(unit) for routine progression of care Report consisted of patients Situation, Background, Assessment and  
Recommendations(SBAR). Information from the following report(s) SBAR, ED Summary, [facility] and Recent Results was reviewed with the receiving nurse. Lines:    
 
Opportunity for questions and clarification was provided. Patient transported with: 
 Registered Nurse

## 2018-10-13 NOTE — CONSULTS
CARDIOLOGY CONSULT       Date of  Admission: 10/12/2018  5:10 PM     Admission type:Emergency    Consult for: atrial flutter  Consulted by:  Dr. Magan Lane     Subjective:     Blas Mcgowan is a 80 y.o. male admitted for Sepsis [facility]), possible right sided PNA. Noted to have probable atrial flutter on EKG for which we care consulted. Unable to obtain history due to advance dementia. Rate about 100-110.     Patient Active Problem List    Diagnosis Date Noted    Sepsis ([facility] [address] 10/13/2018    H/O: CVA (cerebrovascular accident) 10/13/2018    CVA (cerebral infarction) 01/01/2013    DNR (do not resuscitate) 12/30/2012    HTN (hypertension) 12/30/2012    COPD with acute bronchitis ([facility] [address] 04/09/2011    Coronary atherosclerosis of native coronary artery 04/09/2011    DM (diabetes mellitus) ([facility] [address] 04/09/2011    Dementia 04/09/2011      Ehsan Almaguer MD  Past Medical History:   Diagnosis Date    CAD (coronary artery disease) 2000    MI    Coagulation defects     bruises easily    COPD     DEMENTIA     Diabetes ([facility] [address]     IDDM    Hypertension     Other ill-defined conditions(799.89)     increased cholesterol    Unspecified cerebral artery occlusion with cerebral infarction [facility])        Social History     Social History    Marital status:      Spouse name: N/A    Number of children: N/A    Years of education: N/A     Social History Main Topics    Smoking status: Former Smoker    Smokeless tobacco: Not on file      Comment: former pipe/cigar smoker - quit 25 yrs ago    Alcohol use No    Drug use: No    Sexual activity: Not on file     Other Topics Concern    Not on file     Social History Narrative     Allergies   Allergen Reactions    Macrolide Antibiotics Unknown (comments)    Statins-Hmg-Coa Reductase Inhibitors Myalgia    Vitamin B12 With Intrinsic Factor Other (comments)    Zetia [Ezetimibe] Shortness of Breath      Family History   Problem Relation Age of Onset    Cancer Sister breast    Cancer Brother      brain    Lung Disease Father       Current Facility-Administered Medications   Medication Dose Route Frequency    vancomycin (VANCOCIN) 1,000 mg in 0.9% sodium chloride (MBP/ADV) 250 mL  1,000 mg IntraVENous Q18H    heparin (porcine) injection 5,000 Units  5,000 Units SubCUTAneous Q12H    citalopram (CELEXA) tablet 20 mg  20 mg Oral DAILY    [START ON 10/14/2018] clopidogrel (PLAVIX) tablet 75 mg  75 mg Oral DAILY    cilostazol (PLETAL) tablet 100 mg  100 mg Oral ACB&D    fenofibrate nanocrystallized (TRICOR) tablet 48 mg  48 mg Oral DAILY    donepezil (ARICEPT) tablet 10 mg  10 mg Oral DAILY    memantine (NAMENDA) tablet 10 mg  10 mg Oral BID    glucose chewable tablet 16 g  4 Tab Oral PRN    dextrose (D50W) injection syrg 12.5-25 g  25-50 mL IntraVENous PRN    glucagon (GLUCAGEN) injection 1 mg  1 mg IntraMUSCular PRN    insulin lispro (HUMALOG) injection   SubCUTAneous AC&HS    polyethylene glycol (MIRALAX) packet 17 g  17 g Oral DAILY    piperacillin-tazobactam (ZOSYN) 4.5 g in 0.9% sodium chloride (MBP/ADV) 100 mL  4.5 g IntraVENous Q8H         Review of Symptoms:  11 systems reviewed, negative other than as stated in HPI     Subjective:        Visit Vitals    /79 (BP 1 Location: Right arm, BP Patient Position: At rest)    Pulse (!) 108    Temp 97.5 °F (36.4 °C)    Resp 18    Ht 6' 2\" (1.88 m)    Wt 163 lb 5.8 oz (74.1 kg)    SpO2 96%    BMI 20.97 kg/m2       Physical:  Gen:  somnolent  Heart: regular rhythm, tachy no murmur, gallop or rub  Lungs: severely decreased on right  Neck: supple, symmetrical, trachea midline, no adenopathy, thyroid: not enlarged, symmetric, no tenderness/mass/nodules, no carotid bruit and no JVD  Abdomen: soft, non-tender.  Bowel sounds normal. No masses,  no organomegaly  Extremities: extremities normal, atraumatic, no cyanosis or edema, positive femorals without bruits, normal dp pulses  Neurologic: sleeping currently, dementia severe at baseline    Data Review:   Recent Labs      10/12/18   1738   WBC  5.9   HGB  10.3*   HCT  32.1*   PLT  181     Recent Labs      10/13/18   0400  10/12/18   1738   NA  147*  145   K  4.5  4.9   CL  111*  111*   CO2  22  29   GLU  113*  108*   BUN  43*  42*   CREA  1.29  1.29   CA  8.4*  8.8   MG   --   2.1   ALB   --   3.5   TBILI   --   0.6   SGOT   --   19   ALT   --   25   INR   --   1.1       Recent Labs      10/12/18   1738   TROIQ  0.09*       Lab Results   Component Value Date/Time    Cholesterol, total 145 12/30/2012 04:00 AM    HDL Cholesterol 35 12/30/2012 04:00 AM    LDL, calculated 89 12/30/2012 04:00 AM    VLDL, calculated 21 12/30/2012 04:00 AM    Triglyceride 105 12/30/2012 04:00 AM    CHOL/HDL Ratio 4.1 12/30/2012 04:00 AM         No intake or output data in the 24 hours ending 10/13/18 1620     Cardiographics    Telemetry: probable atrial flutter rate about 110    ECG: probable atypical atrial flutter- very tiny flutter waves if so- repeating EKG    Echocardiogram: ordered     Assessment:       Active Problems:    COPD with acute bronchitis ([facility] (4/9/2011)      Coronary atherosclerosis of native coronary artery (4/9/2011)      DM (diabetes mellitus) ([facility] (4/9/2011)      Dementia (4/9/2011)      DNR (do not resuscitate) (12/30/2012)      HTN (hypertension) (12/30/2012)      Sepsis ([facility] (10/13/2018)      H/O: CVA (cerebrovascular accident) (10/13/2018)         Plan:     79 y/o admitted with sepsis due to suspected PNA, probable atypical atrial flutter on EKG. Probable atypical A.  Flutter:  · Repeat ekg as flutter waves are tiny to confirm  · Check echo  · Check TSH.  K and Mag OK  · Continue plavix for now, rate control not bad  · Hold off on diltiazem with hypotension last night  · CHADS VASC score is high but so is fall risk  · No indication for cardioversion as no clear symptoms  · Need to know quality of life, fall history to determine aggressiveness of care    Acute hypoxic respiratory failure  Suspected Severe Sepsis from Pneumonia: can not rule out HCAP  Bilateral Pleural Effusions; R>L- per IM    Thanks for the consult. I appreciate the opportunity to participate in this patient's care.

## 2018-10-13 NOTE — PROGRESS NOTES
Pharmacy Automatic Renal Dosing Protocol - Antimicrobials Indication for Antimicrobials: HAP Current Regimen of Each Antimicrobial: 
Vancomycin 1.5 gm IV x 1, then 1 gm IV Q18H (Start Date 10/13; Day # 1) Zosyn 3.375 gm IV x 1, then 3.375 gm IV Q8H (Start Date 10/13; Day # 1) Previous Antimicrobial Therapy: 
 
Goal Level: VANCOMYCIN TROUGH GOAL RANGE Vancomycin Trough: 15 - 20 mcg/mL Date Dose & Interval Measured (mcg/mL) Extrapolated (mcg/mL) Significant Cultures:  
10/12 - Blood Cx - pending Radiology / Imaging results: (X-ray, CT scan or MRI):  
10/12 - CXR - small left pleural effusion 10/12 - CT abd and chest- no PE ; bilateral pleural effusion (R> L) Paralysis, amputations, malnutrition: n/a Labs: 
Recent Labs 10/13/18 
 0400  10/12/18 
 1738 CREA  1.29  1.29 BUN  43*  42* WBC   --   5.9 Temp (24hrs), Av.2 °F (36.8 °C), Min:97.2 °F (36.2 °C), Max:99.2 °F (37.3 °C) Creatinine Clearance (mL/min) or Dialysis: 42 Impression/Plan: · Blood cx pending ; Afebrile · Imaging suggestive of pneumonia (R>L) · Continue with vancomycin 1 gm IV Q18H  
· Will adjust Zosyn dose from 3.375 gm to 4.5 gm IV Q8H for pneumonia indication and renal function · Antimicrobial stop date: Aim for 7 days Pharmacy will follow daily and adjust medications as appropriate for renal function and/or serum levels. Thank you, Tricia Mendez, PharmD

## 2018-10-13 NOTE — H&P
Hospitalist Admission NoteNAME: Brenda Sellers :  1931 MRN:  [id number] Date/Time:  10/13/2018 2:03 AM 
 
Patient PCP: Scarlett Medina MD 
______________________________________________________________________ Given the patient's current clinical presentation, I have a high level of concern for decompensation if discharged from the emergency department. Complex decision making was performed, which includes reviewing the patient's available past medical records, laboratory results, and x-ray films. My assessment of this patient's clinical condition and my plan of care is as follows. Assessment / Plan: 
Acute hypoxic respiratory failure 
-CXR: cardiomegaly w near complete opacification of Rt lung, suggestive of large effusion with atelectasis, mild pulm edema 
-CTA chest: no PE, airspace Dz Rt lung may represent atelectasis or PNA 
-NC to maintain sats > 90 Sepsis HCAP 
-tachycardia, tachypnea 
-imaging findings as above 
-iV abx with vanc and zosyn 
-hypotension resolved with fluid bolus 
-lactic acid 2.1--> 1.4 
-received small fluid bolus in ED, BP has been stable since. BNP 16K, unknown baseline with b/l pleural effusions/edema. Will hold off on IVF now and monitor pressure closely, small boluses prn to maintain MAP B/L pleural effusions, R > L 
-Rt thoracentesis: fluid studies ordered Atrial flutter 
-new? Daughter thinks patient may have been told he has an arhythmia but unsure 
-cardio consult to assist in work-up 
-check echo 
-defer to cardio on rate control, seems to be poor candidate for [facility] 
 
DM2 
-SSI and POCs CAD 
HTN 
-hold amlodipine and lisinopril while treating for sepsis 
-cont plavix, cilostazol Advanced Alzheimer's dementia 
-cont namenda, aricept Constipation 
-miralax, soap suds enema Code Status: DNR Surrogate Decision Maker: DVT Prophylaxis: sq heparin GI Prophylaxis: not indicated Baseline: Mostly Wheelchair bound at [facility] Subjective: CHIEF COMPLAINT: increased work of breathing, abnormal chest Xray HISTORY OF PRESENT ILLNESS:    
Gloria Olson is a 80 y.o. male with PMHx of CAD, HTN, advanced Alzheimer's dementia, brought by EMS from an [facility] with report of increased work of breathing and an abnormal CXR, which was performed at the residence. Per EMS notes, patient was hypoxic with sats in 80s prior to arrival. The patient cannot provide any history, which is therefore obtained by review of the medical record, EMS documentation and ER staff/notes, and speaking by phone with the patient's daughter. Per the daughter, the patient is bed and wheelchair bound where he resides and the medical record reveals dementia dating back to at least 2012. Per the daughter, the patient has experienced significant decline in function over the past year. We were asked to admit for work up and evaluation of the above problems. Past Medical History:  
Diagnosis Date  CAD (coronary artery disease) 2000 MI  Coagulation defects   
 bruises easily  COPD  DEMENTIA  Diabetes ([address] IDDM  Hypertension  Other ill-defined conditions(799.89)   
 increased cholesterol  Unspecified cerebral artery occlusion with cerebral infarction ([facility] [address] Past Surgical History:  
Procedure Laterality Date  CARDIAC SURG PROCEDURE UNLIST  2005  
 cardiac cath - heart stent x ?3  HX APPENDECTOMY  HX TONSILLECTOMY  HX UROLOGICAL  1990  
 prostate - removal of polyp Social History Substance Use Topics  Smoking status: Former Smoker  Smokeless tobacco: Not on file Comment: former pipe/cigar smoker - quit 25 yrs ago  Alcohol use No  
  
 
Family History Problem Relation Age of Onset  Cancer Sister   
  breast  
 Cancer Brother   
  brain  Lung Disease Father Allergies Allergen Reactions  Macrolide Antibiotics Unknown (comments)  Statins-Hmg-Coa Reductase Inhibitors Myalgia  Vitamin B12 With Intrinsic Factor Other (comments)  Zetia [Ezetimibe] Shortness of Breath Prior to Admission medications Medication Sig Start Date End Date Taking? Authorizing Provider  
citalopram (CELEXA) 20 mg tablet Take 20 mg by mouth daily. Karine Tran MD  
amLODIPine (NORVASC) 5 mg tablet Take 1 Tab by mouth daily. 1/1/13   Miguelangel Allison III, DO  
clopidogrel (PLAVIX) 75 mg tablet Take 1 Tab by mouth daily. 1/1/13   Miguelangel Allison III, DO  
memantine (NAMENDA) 10 mg tablet Take 10 mg by mouth two (2) times a day. Karine Tran MD  
fenofibrate (TRICOR) 54 mg tablet Take 160 mg by mouth daily. Karine Tran MD  
cilostazol (PLETAL) 100 mg tablet Take 100 mg by mouth Before breakfast and dinner. Karine Tran MD  
NPH HUMAN INSULIN ISOPHANE (HUMULIN N SC) 30 Units by SubCUTAneous route two (2) times a day. 4/9/11   Karine Tran MD  
donepezil (ARICEPT) 10 mg tablet Take 10 mg by mouth daily. Karine Tran MD  
lisinopril (PRINIVIL, ZESTRIL) 20 mg tablet Take 20 mg by mouth daily. Historical Provider  
[facility]) 625 mg tablet Take 1,875 mg by mouth two (2) times a day. Historical Provider REVIEW OF SYSTEMS:    
I am not able to complete the review of systems because: The patient is intubated and sedated The patient has altered mental status due to his acute medical problems The patient has baseline aphasia from prior stroke(s) The patient has baseline dementia and is not reliable historian The patient is in acute medical distress and unable to provide information Dementia Total of 12 systems reviewed as follows:   
   POSITIVE= underlined text  Negative = text not underlined General:  fever, chills, sweats, generalized weakness, weight loss/gain,  
   loss of appetite Eyes:    blurred vision, eye pain, loss of vision, double vision ENT:    rhinorrhea, pharyngitis Respiratory:   cough, sputum production, SOB, GORDILLO, wheezing, pleuritic pain  
Cardiology:   chest pain, palpitations, orthopnea, PND, edema, syncope Gastrointestinal:  abdominal pain , N/V, diarrhea, dysphagia, constipation, bleeding Genitourinary:  frequency, urgency, dysuria, hematuria, incontinence Muskuloskeletal :  arthralgia, myalgia, back pain Hematology:  easy bruising, nose or gum bleeding, lymphadenopathy Dermatological: rash, ulceration, pruritis, color change / jaundice Endocrine:   hot flashes or polydipsia Neurological:  headache, dizziness, confusion, focal weakness, paresthesia, Speech difficulties, memory loss, gait difficulty Psychological: Feelings of anxiety, depression, agitation Objective: VITALS:   
Visit Vitals  BP (!) 121/93  Pulse (!) 111  Temp 99.2 °F (37.3 °C)  Resp (!) 31  
 Ht 6' 2\" (1.88 m)  Wt 74.1 kg (163 lb 5.8 oz)  SpO2 94%  BMI 20.97 kg/m2 PHYSICAL EXAM: 
 
General:    Cachectic elderly male, lying in bed. Somnolent but arousable. HEENT: Atraumatic, anicteric sclerae, pink conjunctivae No oral ulcers, mucosa moist, throat clear, dentition fair Neck:  Supple, symmetrical,  thyroid: non tender Lungs: Tachypnic diminished air entry R > L.  No Wheezing or Rhonchi. No rales. Chest wall:  No tenderness  No Accessory muscle use. Heart:   Tachycardic,  No  murmur   No edema Abdomen:   Soft, non-tender. Not distended. Bowel sounds normal 
Extremities: No cyanosis. No clubbing,   
  Skin turgor normal, Capillary refill normal, Radial dial pulse 2+ Skin:     Not pale. Not Jaundiced  No rashes Psych:  Good insight. Not depressed. Not anxious or agitated. Neurologic: EOMs intact. No facial asymmetry. No aphasia or slurred speech. Symmetrical strength, Sensation grossly intact. Alert and oriented X 4.  
 
_______________________________________________________________________ Care Plan discussed with: 
  Comments Patient Family  x Daughter by phone RN x Care Manager Consultant:     
_______________________________________________________________________ Expected  Disposition:  
Home with Family HH/PT/OT/RN   
SNF/LTC x  
[facility]ES[facility]   
________________________________________________________________________ TOTAL TIME:  Greater than 60 Minutes Critical Care Provided     Minutes non procedure based Comments Reviewed previous records  
>50% of visit spent in counseling and coordination of care x Discussion with patient and/or family and questions answered 
  
 
________________________________________________________________________ Signed: Yolie Healy DO 
 
Procedures: see electronic medical records for all procedures/Xrays and details which were not copied into this note but were reviewed prior to creation of Plan. LAB DATA REVIEWED:   
Recent Results (from the past 24 hour(s)) LACTIC ACID Collection Time: 10/12/18  5:38 PM  
Result Value Ref Range Lactic acid 2.1 (HH) 0.4 - 2.0 MMOL/L  
CBC WITH AUTOMATED DIFF Collection Time: 10/12/18  5:38 PM  
Result Value Ref Range WBC 5.9 4.1 - 11.1 K/uL  
 RBC 3.38 (L) 4.10 - 5.70 M/uL  
 HGB 10.3 (L) 12.1 - 17.0 g/dL HCT 32.1 (L) 36.6 - 50.3 % MCV 95.0 80.0 - 99.0 FL  
 MCH 30.5 26.0 - 34.0 PG  
 MCHC 32.1 30.0 - 36.5 g/dL  
 RDW 15.8 (H) 11.5 - 14.5 % PLATELET 940 532 - 510 K/uL MPV 11.1 8.9 - 12.9 FL  
 NRBC 0.0 0  WBC ABSOLUTE NRBC 0.00 0.00 - 0.01 K/uL NEUTROPHILS 82 (H) 32 - 75 % LYMPHOCYTES 10 (L) 12 - 49 % MONOCYTES 7 5 - 13 % EOSINOPHILS 1 0 - 7 % BASOPHILS 0 0 - 1 % IMMATURE GRANULOCYTES 0 0.0 - 0.5 % ABS. NEUTROPHILS 4.8 1.8 - 8.0 K/UL  
 ABS. LYMPHOCYTES 0.6 (L) 0.8 - 3.5 K/UL  
 ABS. MONOCYTES 0.4 0.0 - 1.0 K/UL  
 ABS. EOSINOPHILS 0.0 0.0 - 0.4 K/UL  
 ABS. BASOPHILS 0.0 0.0 - 0.1 K/UL ABS. IMM. GRANS. 0.0 0.00 - 0.04 K/UL  
 DF AUTOMATED PROTHROMBIN TIME + INR Collection Time: 10/12/18  5:38 PM  
Result Value Ref Range INR 1.1 0.9 - 1.1 Prothrombin time 11.5 (H) 9.0 - 11.1 sec METABOLIC PANEL, COMPREHENSIVE Collection Time: 10/12/18  5:38 PM  
Result Value Ref Range Sodium 145 136 - 145 mmol/L Potassium 4.9 3.5 - 5.1 mmol/L Chloride 111 (H) 97 - 108 mmol/L  
 CO2 29 21 - 32 mmol/L Anion gap 5 5 - 15 mmol/L Glucose 108 (H) 65 - 100 mg/dL BUN 42 (H) 6 - 20 MG/DL Creatinine 1.29 0.70 - 1.30 MG/DL  
 BUN/Creatinine ratio 33 (H) 12 - 20 GFR est AA >60 >60 ml/min/1.73m2 GFR est non-AA 53 (L) >60 ml/min/1.73m2 Calcium 8.8 8.5 - 10.1 MG/DL Bilirubin, total 0.6 0.2 - 1.0 MG/DL  
 ALT (SGPT) 25 12 - 78 U/L  
 AST (SGOT) 19 15 - 37 U/L Alk. phosphatase 73 45 - 117 U/L Protein, total 7.6 6.4 - 8.2 g/dL Albumin 3.5 3.5 - 5.0 g/dL Globulin 4.1 (H) 2.0 - 4.0 g/dL A-G Ratio 0.9 (L) 1.1 - 2.2 NT-PRO BNP Collection Time: 10/12/18  5:38 PM  
Result Value Ref Range NT pro-BNP 64676 (H) 0 - 450 PG/ML  
TROPONIN I Collection Time: 10/12/18  5:38 PM  
Result Value Ref Range Troponin-I, Qt. 0.09 (H) <0.05 ng/mL LIPASE Collection Time: 10/12/18  5:38 PM  
Result Value Ref Range Lipase 90 73 - 393 U/L MAGNESIUM Collection Time: 10/12/18  5:38 PM  
Result Value Ref Range Magnesium 2.1 1.6 - 2.4 mg/dL POC CREATININE Collection Time: 10/12/18  5:52 PM  
Result Value Ref Range Creatinine (POC) 1.3 0.6 - 1.3 mg/dL GFRAA, POC >60 >60 ml/min/1.73m2 GFRNA, POC 52 (L) >60 ml/min/1.73m2 EKG, 12 LEAD, INITIAL Collection Time: 10/12/18  6:46 PM  
Result Value Ref Range Ventricular Rate 112 BPM  
 Atrial Rate 224 BPM  
 QRS Duration 130 ms  
 Q-T Interval 368 ms QTC Calculation (Bezet) 502 ms Calculated P Axis -171 degrees Calculated R Axis -55 degrees Calculated T Axis 178 degrees Diagnosis Atrial flutter with 2:1 AV conduction Right bundle branch block Left anterior fascicular block ** Bifascicular block ** Left ventricular hypertrophy with repolarization abnormality Cannot rule out Septal infarct , age undetermined When compared with ECG of 24-JUL-2015 17:38, Atrial flutter has replaced Sinus rhythm Vent. rate has increased BY  39 BPM 
Right bundle branch block has replaced Nonspecific intraventricular block LACTIC ACID Collection Time: 10/12/18  9:28 PM  
Result Value Ref Range  Lactic acid 1.4 0.4 - 2.0 MMOL/L

## 2018-10-13 NOTE — PROGRESS NOTES
Problem: Falls - Risk of 
Goal: *Absence of Falls Document Stacey Bees Fall Risk and appropriate interventions in the flowsheet. Outcome: Progressing Towards Goal 
Fall Risk Interventions: 
  
 
  
 
  
 
  
 
  
 
 
 
Problem: Pressure Injury - Risk of 
Goal: *Prevention of pressure injury Document Theodore Scale and appropriate interventions in the flowsheet. Outcome: Progressing Towards Goal 
Pressure Injury Interventions: 
Sensory Interventions: Discuss PT/OT consult with provider Moisture Interventions: Limit adult briefs Activity Interventions: Increase time out of bed Mobility Interventions: Float heels Friction and Shear Interventions: Lift sheet

## 2018-10-14 LAB
ALBUMIN SERPL-MCNC: 3.1 G/DL (ref 3.5–5)
ALBUMIN/GLOB SERPL: 0.8 {RATIO} (ref 1.1–2.2)
ALP SERPL-CCNC: 68 U/L (ref 45–117)
ALT SERPL-CCNC: 39 U/L (ref 12–78)
ANION GAP SERPL CALC-SCNC: 11 MMOL/L (ref 5–15)
AST SERPL-CCNC: 29 U/L (ref 15–37)
ATRIAL RATE: 110 BPM
ATRIAL RATE: 224 BPM
BASOPHILS # BLD: 0 K/UL (ref 0–0.1)
BASOPHILS NFR BLD: 0 % (ref 0–1)
BILIRUB SERPL-MCNC: 1 MG/DL (ref 0.2–1)
BUN SERPL-MCNC: 47 MG/DL (ref 6–20)
BUN/CREAT SERPL: 39 (ref 12–20)
CALCIUM SERPL-MCNC: 8.1 MG/DL (ref 8.5–10.1)
CALCULATED P AXIS, ECG09: -171 DEGREES
CALCULATED R AXIS, ECG10: -55 DEGREES
CALCULATED R AXIS, ECG10: -72 DEGREES
CALCULATED T AXIS, ECG11: 178 DEGREES
CALCULATED T AXIS, ECG11: 180 DEGREES
CHLORIDE SERPL-SCNC: 113 MMOL/L (ref 97–108)
CHOLEST SERPL-MCNC: 141 MG/DL
CO2 SERPL-SCNC: 22 MMOL/L (ref 21–32)
CREAT SERPL-MCNC: 1.21 MG/DL (ref 0.7–1.3)
DIAGNOSIS, 93000: NORMAL
DIAGNOSIS, 93000: NORMAL
DIFFERENTIAL METHOD BLD: ABNORMAL
EOSINOPHIL # BLD: 0 K/UL (ref 0–0.4)
EOSINOPHIL NFR BLD: 0 % (ref 0–7)
ERYTHROCYTE [DISTWIDTH] IN BLOOD BY AUTOMATED COUNT: 16 % (ref 11.5–14.5)
EST. AVERAGE GLUCOSE BLD GHB EST-MCNC: 97 MG/DL
GLOBULIN SER CALC-MCNC: 3.7 G/DL (ref 2–4)
GLUCOSE BLD STRIP.AUTO-MCNC: 104 MG/DL (ref 65–100)
GLUCOSE BLD STRIP.AUTO-MCNC: 108 MG/DL (ref 65–100)
GLUCOSE BLD STRIP.AUTO-MCNC: 137 MG/DL (ref 65–100)
GLUCOSE BLD STRIP.AUTO-MCNC: 151 MG/DL (ref 65–100)
GLUCOSE SERPL-MCNC: 104 MG/DL (ref 65–100)
HBA1C MFR BLD: 5 % (ref 4.2–6.3)
HCT VFR BLD AUTO: 32.8 % (ref 36.6–50.3)
HDLC SERPL-MCNC: 55 MG/DL
HDLC SERPL: 2.6 {RATIO} (ref 0–5)
HGB BLD-MCNC: 10.1 G/DL (ref 12.1–17)
IMM GRANULOCYTES # BLD: 0 K/UL (ref 0–0.04)
IMM GRANULOCYTES NFR BLD AUTO: 0 % (ref 0–0.5)
LDLC SERPL CALC-MCNC: 71.4 MG/DL (ref 0–100)
LIPID PROFILE,FLP: NORMAL
LYMPHOCYTES # BLD: 0.4 K/UL (ref 0.8–3.5)
LYMPHOCYTES NFR BLD: 5 % (ref 12–49)
MAGNESIUM SERPL-MCNC: 2.1 MG/DL (ref 1.6–2.4)
MCH RBC QN AUTO: 30 PG (ref 26–34)
MCHC RBC AUTO-ENTMCNC: 30.8 G/DL (ref 30–36.5)
MCV RBC AUTO: 97.3 FL (ref 80–99)
MONOCYTES # BLD: 0.5 K/UL (ref 0–1)
MONOCYTES NFR BLD: 7 % (ref 5–13)
NEUTS SEG # BLD: 6.5 K/UL (ref 1.8–8)
NEUTS SEG NFR BLD: 87 % (ref 32–75)
NRBC # BLD: 0 K/UL (ref 0–0.01)
NRBC BLD-RTO: 0 PER 100 WBC
P-R INTERVAL, ECG05: 230 MS
PHOSPHATE SERPL-MCNC: 4.7 MG/DL (ref 2.6–4.7)
PLATELET # BLD AUTO: 203 K/UL (ref 150–400)
PMV BLD AUTO: 10.8 FL (ref 8.9–12.9)
POTASSIUM SERPL-SCNC: 4.2 MMOL/L (ref 3.5–5.1)
PROT SERPL-MCNC: 6.8 G/DL (ref 6.4–8.2)
Q-T INTERVAL, ECG07: 368 MS
Q-T INTERVAL, ECG07: 422 MS
QRS DURATION, ECG06: 130 MS
QRS DURATION, ECG06: 130 MS
QTC CALCULATION (BEZET), ECG08: 502 MS
QTC CALCULATION (BEZET), ECG08: 571 MS
RBC # BLD AUTO: 3.37 M/UL (ref 4.1–5.7)
SERVICE CMNT-IMP: ABNORMAL
SODIUM SERPL-SCNC: 146 MMOL/L (ref 136–145)
T4 FREE SERPL-MCNC: 1.2 NG/DL (ref 0.8–1.5)
TRIGL SERPL-MCNC: 73 MG/DL (ref ?–150)
TROPONIN I SERPL-MCNC: 0.1 NG/ML
TSH SERPL DL<=0.05 MIU/L-ACNC: 0.81 UIU/ML (ref 0.36–3.74)
VENTRICULAR RATE, ECG03: 110 BPM
VENTRICULAR RATE, ECG03: 112 BPM
VLDLC SERPL CALC-MCNC: 14.6 MG/DL
WBC # BLD AUTO: 7.5 K/UL (ref 4.1–11.1)

## 2018-10-14 PROCEDURE — 84443 ASSAY THYROID STIM HORMONE: CPT | Performed by: INTERNAL MEDICINE

## 2018-10-14 PROCEDURE — 84439 ASSAY OF FREE THYROXINE: CPT | Performed by: INTERNAL MEDICINE

## 2018-10-14 PROCEDURE — 74011000258 HC RX REV CODE- 258: Performed by: INTERNAL MEDICINE

## 2018-10-14 PROCEDURE — 84484 ASSAY OF TROPONIN QUANT: CPT | Performed by: INTERNAL MEDICINE

## 2018-10-14 PROCEDURE — 80061 LIPID PANEL: CPT | Performed by: INTERNAL MEDICINE

## 2018-10-14 PROCEDURE — 82962 GLUCOSE BLOOD TEST: CPT

## 2018-10-14 PROCEDURE — 74011250637 HC RX REV CODE- 250/637: Performed by: INTERNAL MEDICINE

## 2018-10-14 PROCEDURE — 74011250636 HC RX REV CODE- 250/636: Performed by: INTERNAL MEDICINE

## 2018-10-14 PROCEDURE — 65660000000 HC RM CCU STEPDOWN

## 2018-10-14 PROCEDURE — 84100 ASSAY OF PHOSPHORUS: CPT | Performed by: INTERNAL MEDICINE

## 2018-10-14 PROCEDURE — 36415 COLL VENOUS BLD VENIPUNCTURE: CPT | Performed by: INTERNAL MEDICINE

## 2018-10-14 PROCEDURE — 80053 COMPREHEN METABOLIC PANEL: CPT | Performed by: INTERNAL MEDICINE

## 2018-10-14 PROCEDURE — 74011000250 HC RX REV CODE- 250: Performed by: INTERNAL MEDICINE

## 2018-10-14 PROCEDURE — 85025 COMPLETE CBC W/AUTO DIFF WBC: CPT | Performed by: INTERNAL MEDICINE

## 2018-10-14 PROCEDURE — 83036 HEMOGLOBIN GLYCOSYLATED A1C: CPT | Performed by: INTERNAL MEDICINE

## 2018-10-14 PROCEDURE — 83735 ASSAY OF MAGNESIUM: CPT | Performed by: INTERNAL MEDICINE

## 2018-10-14 RX ORDER — FUROSEMIDE 10 MG/ML
40 INJECTION INTRAMUSCULAR; INTRAVENOUS ONCE
Status: COMPLETED | OUTPATIENT
Start: 2018-10-14 | End: 2018-10-14

## 2018-10-14 RX ADMIN — CLOPIDOGREL BISULFATE 75 MG: 75 TABLET ORAL at 08:37

## 2018-10-14 RX ADMIN — HEPARIN SODIUM 5000 UNITS: 5000 INJECTION INTRAVENOUS; SUBCUTANEOUS at 20:07

## 2018-10-14 RX ADMIN — MEMANTINE 10 MG: 10 TABLET ORAL at 20:07

## 2018-10-14 RX ADMIN — MEMANTINE 10 MG: 10 TABLET ORAL at 08:37

## 2018-10-14 RX ADMIN — CILOSTAZOL 100 MG: 100 TABLET ORAL at 08:36

## 2018-10-14 RX ADMIN — DONEPEZIL HYDROCHLORIDE 10 MG: 5 TABLET, FILM COATED ORAL at 08:36

## 2018-10-14 RX ADMIN — POLYETHYLENE GLYCOL 3350 17 G: 17 POWDER, FOR SOLUTION ORAL at 08:37

## 2018-10-14 RX ADMIN — PIPERACILLIN SODIUM,TAZOBACTAM SODIUM 4.5 G: 4; .5 INJECTION, POWDER, FOR SOLUTION INTRAVENOUS at 10:47

## 2018-10-14 RX ADMIN — FENOFIBRATE 48 MG: 48 TABLET ORAL at 08:37

## 2018-10-14 RX ADMIN — VANCOMYCIN HYDROCHLORIDE 1000 MG: 1 INJECTION, POWDER, LYOPHILIZED, FOR SOLUTION INTRAVENOUS at 14:17

## 2018-10-14 RX ADMIN — PIPERACILLIN SODIUM,TAZOBACTAM SODIUM 4.5 G: 4; .5 INJECTION, POWDER, FOR SOLUTION INTRAVENOUS at 19:30

## 2018-10-14 RX ADMIN — FUROSEMIDE 40 MG: 10 INJECTION, SOLUTION INTRAMUSCULAR; INTRAVENOUS at 08:37

## 2018-10-14 RX ADMIN — PIPERACILLIN SODIUM,TAZOBACTAM SODIUM 4.5 G: 4; .5 INJECTION, POWDER, FOR SOLUTION INTRAVENOUS at 03:14

## 2018-10-14 RX ADMIN — CITALOPRAM HYDROBROMIDE 20 MG: 20 TABLET ORAL at 08:37

## 2018-10-14 RX ADMIN — HEPARIN SODIUM 5000 UNITS: 5000 INJECTION INTRAVENOUS; SUBCUTANEOUS at 08:37

## 2018-10-14 NOTE — PROGRESS NOTES
Problem: Falls - Risk of 
Goal: *Absence of Falls Document Maddie Cassidy Fall Risk and appropriate interventions in the flowsheet. Outcome: Progressing Towards Goal 
Fall Risk Interventions: 
Mobility Interventions: Bed/chair exit alarm Mentation Interventions: Family/sitter at bedside Medication Interventions: Evaluate medications/consider consulting pharmacy Elimination Interventions: Call light in reach Problem: Pressure Injury - Risk of 
Goal: *Prevention of pressure injury Document Theodore Scale and appropriate interventions in the flowsheet. Outcome: Progressing Towards Goal 
Pressure Injury Interventions: 
Sensory Interventions: Keep linens dry and wrinkle-free Moisture Interventions: Absorbent underpads Activity Interventions: Pressure redistribution bed/mattress(bed type) Mobility Interventions: HOB 30 degrees or less Nutrition Interventions: Document food/fluid/supplement intake Friction and Shear Interventions: Feet elevated on foot rest

## 2018-10-14 NOTE — CONSULTS
KEYA/ Zaida 29  MR#: [id number]  : 1931  ACCOUNT #: [de-identified]   DATE OF SERVICE: 10/13/2018    REQUESTING PHYSICIAN:  Hospitalist Service. INDICATIONS: Pleural effusion. CHIEF COMPLAINT: Difficult to obtain secondary to encephalopathy. HISTORY OF PRESENT ILLNESS:  The patient is an 60-year-old gentleman who presented to the hospital secondary to hypoxemic respiratory failure, was noted to have right pleural effusion with pneumonia that was seen on chest x-ray that showed bilateral pleural effusions. He was given fluids in addition and started on antibiotics with a nonurgent thoracentesis that was ordered. He also had a proBNP level that was over 16,000. I have been asked to help assist in his management. He has a sitter at bedside at this time secondary to his dementia and delirium. He will receive medications and patient, while he is arousable, is unable to answer questions. MEDICAL HISTORY:  Alzheimer's, coronary artery disease, dementia, diabetes, history of CVA. SOCIAL HISTORY:  Former smoker. FAMILY HISTORY:  Positive for lung disease but no lung cancer. ALLERGIES:  MACROLIDES, STATINS, VITAMIN B AND ZETIA. HOME MEDICATIONS:  Celexa, Norvasc, Plavix, Namenda, TriCor, Pletal, NPH, Aricept, Prinivil and Welchol. REVIEW OF SYSTEMS:  Cannot obtain secondary to his mental status changes. PHYSICAL EXAMINATION:  VITAL SIGNS:  Temperature 97.7 with pulse 19, blood pressure 104/68, sats are 96%. He is on nasal cannula. GENERAL:  Well-developed, well-nourished  gentleman responds to voice. HEENT:  Normocephalic, atraumatic. NECK:  Supple without mass, JVD, carotid bruits. LYMPHATIC:  No palpable supraclavicular, submandibular lymphadenopathy. LUNGS:  Decreased breath sounds at the bases. HEART:  Mildly tachycardic. ABDOMEN:  Soft, nontender. EXTREMITIES:  No cyanosis or clubbing.   NEUROLOGIC:  Does move extremities to stimuli. IMAGING:  A chest CT was reviewed and showed bilateral pleural effusions questioning some nodularity in the right and airspace disease bilaterally, worse on the right compared to the left. LABORATORY DATA:  Sodium 147, potassium 4.5, chloride 111, CO2 22, BUN 43, creatinine 1.29, glucose of 113, lactic acid of 1.4 improving, white count of 5.9 with hemoglobin 10.3, platelet count of 907. ASSESSMENT:  1.  Bilateral pleural effusions, moderate sized, right greater than left. 2.  Pneumonia. 3.  Advanced age. 4.  Dementia. 5.  Former smoker. DISCUSSION AND PLAN:  1. I feel that the right side deserves a thoracentesis, especially given the possible studding of the pleura, would send for cytology. 2.  Broad spectrum antibiotics:  Does have significant allergies, has been placed on Zosyn and vancomycin. This likely represents an aspiration pneumonia, given his overall mental status. 3.  Atrial flutter:  Cardiology is consulted in addition to echocardiogram to be performed. 4.  Resume home medications as tolerated. 5.  Need sitter secondary to his delirium.       MD RAYMOND Gonzales /   D: 10/13/2018 15:54     T: 10/13/2018 21:25  JOB #: [id number]

## 2018-10-14 NOTE — PROGRESS NOTES
Hospitalist Progress Note NAME: Dionne Dyson :  1931 MRN:  [id number] Assessment / Plan: 
Acute hypoxic respiratory failure Suspected Severe Sepsis from Pneumonia: can not rule out HCAP Bilateral Pleural Effusions; R>L Elevated Lactic Acid: 2.1; improved 
-CXR: cardiomegaly w near complete opacification of Rt lung, suggestive of large effusion with atelectasis, mild pulm edema 
-CTA chest: \"IMPRESSION: No evidence of acute pulmonary embolus. Small bilateral pleural effusions, right greater than left, with some associated pleural enhancement; superimposed infection is not excluded. Airspace disease in the right lung may represent atelectasis or pneumonia. Probable mild pulmonary edema and bilateral dependent atelectasis. Cardiomegaly with extensive coronary artery calcifications. Cholecystolithiasis, without CT evidence of acute cholecystitis. Large amount 
stool in the distal colon and rectum. Small pelvic free fluid. Enlarged prostate gland. \" 
-patient currently on 2L NC 
-IVF's stopped on 10/13 with concern for pulmonary edema 
-await TTE 
-serial troponin up to 0.1 
-Pulmonary following 
-thoracentesis tomorrow 
-I spoke with patient's daughters on afternoon of 10/13 and chest tube is not desired 
-IV lasix FATIMAH today Hypotension on presentation: BP was 84/58 
-improved; pending BP's later today will consider IV diuretic Atrial flutter with RVR: new? Daughter thinks patient may have been told he has an arhythmia but unsure 
-Cardiology impression appreciated  
-TFT's wnl 
  
DM2 
-continue SSI and check A1c (pending) 
  
CAD 
HTN 
PAD 
-hold home antihypertensives with BP trends 
-cont plavix, cilostazol 
  
Hypernatremia: mild 
-monitor closely given diuresis today Advanced Alzheimer's dementia 
-cont namenda, aricept 
  
Constipation 
-miralax, soap suds enema Hyperlipidemia: 
-controlled with fibrate  
 
Diet ordered today Code Status: DNR 
 Surrogate Decision Maker: Daughter 
  
DVT Prophylaxis: sq heparin GI Prophylaxis: not indicated 
  
Baseline: Mostly Wheelchair bound at SNF [facility]) Dispo: [facility] with ? Hospice (per my discussion with daughter's on 10/13) Subjective: Chief Complaint / Reason for Physician Visit: follow-up respiratory failure Patient  Seen for follow-up Poor historian due to dementia Case discussed with RN Review of Systems: 
Symptom Y/N Comments  Symptom Y/N Comments Fever/Chills    Chest Pain Poor Appetite    Edema Cough    Abdominal Pain Sputum    Joint Pain SOB/GORDILLO    Pruritis/Rash Nausea/vomit    Tolerating PT/OT Diarrhea    Tolerating Diet Constipation    Other Could NOT obtain due to: dementia Objective: VITALS:  
Last 24hrs VS reviewed since prior progress note. Most recent are: 
Patient Vitals for the past 24 hrs: 
 Temp Pulse Resp BP SpO2  
10/14/18 0321 97.5 °F (36.4 °C) (!) 109 18 116/63 96 % 10/13/18 2253 98.6 °F (37 °C) (!) 109 18 106/66 96 % 10/13/18 2037 97.4 °F (36.3 °C) (!) 115 16 (!) 119/97 95 % 10/13/18 1600 98 °F (36.7 °C) (!) 110 18 116/79 91 % 10/13/18 1557 97.5 °F (36.4 °C) (!) 108 - 116/79 -  
10/13/18 1043 97.9 °F (36.6 °C) (!) 109 18 104/68 96 % Intake/Output Summary (Last 24 hours) at 10/14/18 5304 Last data filed at 10/14/18 7515 Gross per 24 hour Intake              100 ml Output                1 ml Net               99 ml PHYSICAL EXAM: 
General: WD, Elderly. Sedated, no acute distress   
EENT:  Pupils reactive. Anicteric sclerae. MM dry Resp:  No wheezing on anterior assessment. No accessory muscle use CV:  Irregular  Rhythm with mild tachycardia,  No edema GI:  Soft, Non distended, Non tender.  +Bowel sounds Neurologic:  Speaks some, follows simple commands, moves all extremities Psych:   Poor Insight. Not anxious nor agitated Skin:  No rashes noted. No jaundice Reviewed most current lab test results and cultures  YES Reviewed most current radiology test results   YES Review and summation of old records today    NO Reviewed patient's current orders and MAR    YES 
PMH/SH reviewed - no change compared to H&P 
________________________________________________________________________ Care Plan discussed with: 
  Comments Patient x Family   Last spoke with daughters on 10/13 RN x Care Manager Consultant Multidiciplinary team rounds were held today with , nursing, pharmacist and clinical coordinator. Patient's plan of care was discussed; medications were reviewed and discharge planning was addressed. ________________________________________________________________________ Total NON critical care TIME:  25   Minutes Total CRITICAL CARE TIME Spent:   Minutes non procedure based Comments >50% of visit spent in counseling and coordination of care    
________________________________________________________________________ Keith Morfin MD  
 
Procedures: see electronic medical records for all procedures/Xrays and details which were not copied into this note but were reviewed prior to creation of Plan. LABS: 
I reviewed today's most current labs and imaging studies. Pertinent labs include: 
Recent Labs 10/14/18 
 0324  10/12/18 
 1738 WBC  7.5  5.9 HGB  10.1*  10.3* HCT  32.8*  32.1*  
PLT  203  181 Recent Labs 10/14/18 
 0324  10/13/18 
 0400  10/12/18 
 1738 NA  146*  147*  145  
K  4.2  4.5  4.9  
CL  113*  111*  111* CO2  22  22  29 GLU  104*  113*  108* BUN  47*  43*  42* CREA  1.21  1.29  1.29  
CA  8.1*  8.4*  8.8 MG  2.1   --   2.1 PHOS  4.7   --    --   
ALB  3.1*   --   3.5 TBILI  1.0   --   0.6 SGOT  29   --   19 ALT  39   --   25 INR   --    --   1.1 Signed: Keith Morfin MD

## 2018-10-14 NOTE — PROGRESS NOTES
Bedside and Verbal shift change report given to [address] [place] [address] Road (oncoming nurse) by NAMRATA Tillman RN (offgoing nurse). Report given with SBAR, Kardex, Intake/Output, MAR and Recent Results.

## 2018-10-14 NOTE — PROGRESS NOTES
PCU SHIFT NURSING NOTE Bedside shift change report given to [facility] (oncoming nurse) by Kailey Cooney (offgoing nurse). Report included the following information SBAR. Shift Summary: 2130- assumed care of pt. Danielle at [facility]. Pt mumbling incoherently. Visually frail, undernourished pt with minimal eye contact but currently clean and dry. 2345- awake in bed, pt talking to himself, remains incoherent. 0330- ruddy lab draw well. Danielle remains at [facility]. ruddy cleaning well. Admission Date 10/12/2018 Admission Diagnosis Sepsis ([address] Consults IP CONSULT TO CARDIOLOGY 
IP CONSULT TO INTERVENTIONAL RADIOLOGY 
IP CONSULT TO INTERVENTIONAL RADIOLOGY 
IP CONSULT TO PULMONOLOGY Consults []PT []OT []Speech  
[]Case Management  
  
[] Palliative Cardiac Monitoring Order []Yes []No  
 
IV drips []Yes Drip:                            Dose: 
Drip:                            Dose: 
Drip:                            Dose:  
[]No  
 
GI Prophylaxis []Yes []No  
 
 
 
DVT Prophylaxis SCDs:     
     
 Arvin stockings:     
  
[] Medication []Contraindicated []None Activity Level Activity Level: Bed Rest   
 Activity Assistance: Complete care Purposeful Rounding every 1-2 hour? []Yes Mehta Score  Total Score: 3 Bed Alarm (If score 3 or >) []Yes  
[] Refused (See signed refusal form in chart) Theodore Score  Theodore Score: 14 Theodore Score (if score 14 or less) []PMT consult  
[]Wound Care consult []Specialty bed  
[] Nutrition consult Needs prior to discharge:  
Home O2 required:   
[]Yes []No  
 If yes, how much O2 required? Other:  
 Last Bowel Movement: Last Bowel Movement Date: 10/13/18 Influenza Vaccine Received Flu Vaccine for Current Season (usually Sept-March): No  
 Patient/Guardian Refused (Notify MD): No  
Pneumonia Vaccine Diet Active Orders There are no active orders of the following type(s): Diet. LDAs Peripheral IV 10/13/18 Right Forearm (Active) Site Assessment Clean, dry, & intact 10/13/2018 10:53 PM  
Phlebitis Assessment 0 10/13/2018 10:53 PM  
Infiltration Assessment 0 10/13/2018 10:53 PM  
Dressing Status Clean, dry, & intact 10/13/2018 10:53 PM  
Dressing Type Transparent 10/13/2018 10:53 PM  
Hub Color/Line Status Pink 10/13/2018 10:53 PM  
Alcohol Cap Used Yes 10/13/2018 10:53 PM  
                  
Urinary Catheter Intake & Output Date 10/13/18 0700 - 10/14/18 6029 10/14/18 0700 - 10/15/18 6394 Shift 1985-3079 2125-7501 24 Hour Total 3126-4811 8144-6850 24 Hour Total  
I 
N 
T 
A 
K 
E 
 I.V. 
(mL/kg/hr)  100 100 Volume (piperacillin-tazobactam (ZOSYN) 4.5 g in 0.9% sodium chloride (MBP/ADV) 100 mL)  100 100 Shift Total 
(mL/kg)  100 
(1.3) 100 
(1.3) O 
U T 
P 
U Han Manjit Urine (mL/kg/hr) Urine Occurrence(s)  2 x 2 x Stool Stool Occurrence(s)  1 x 1 x Shift Total 
(mL/kg) NET  100 100 Weight (kg) 74.1 74.1 74.1 74.1 74.1 74.1 Readmission Risk Assessment Tool Score High Risk   
      
 23 Total Score 3 Has Seen PCP in Last 6 Months (Yes=3, No=0)  
 5 Pt. Coverage (Medicare=5 , Medicaid, or Self-Pay=4) 15 Charlson Comorbidity Score (Age + Comorbid Conditions) Criteria that do not apply:  
 . Living with Significant Other. Assisted Living. LTAC. SNF. or  
Rehab Patient Length of Stay (>5 days = 3) IP Visits Last 12 Months (1-3=4, 4=9, >4=11) Expected Length of Stay - - - Actual Length of Stay 1

## 2018-10-15 LAB
ALBUMIN SERPL-MCNC: 3 G/DL (ref 3.5–5)
ALBUMIN/GLOB SERPL: 0.9 {RATIO} (ref 1.1–2.2)
ALP SERPL-CCNC: 63 U/L (ref 45–117)
ALT SERPL-CCNC: 35 U/L (ref 12–78)
ANION GAP SERPL CALC-SCNC: 11 MMOL/L (ref 5–15)
AST SERPL-CCNC: 22 U/L (ref 15–37)
BASOPHILS # BLD: 0 K/UL (ref 0–0.1)
BASOPHILS NFR BLD: 0 % (ref 0–1)
BILIRUB SERPL-MCNC: 0.9 MG/DL (ref 0.2–1)
BUN SERPL-MCNC: 45 MG/DL (ref 6–20)
BUN/CREAT SERPL: 35 (ref 12–20)
CALCIUM SERPL-MCNC: 8.2 MG/DL (ref 8.5–10.1)
CHLORIDE SERPL-SCNC: 111 MMOL/L (ref 97–108)
CO2 SERPL-SCNC: 22 MMOL/L (ref 21–32)
CREAT SERPL-MCNC: 1.28 MG/DL (ref 0.7–1.3)
DATE LAST DOSE: ABNORMAL
DIFFERENTIAL METHOD BLD: ABNORMAL
EOSINOPHIL # BLD: 0.1 K/UL (ref 0–0.4)
EOSINOPHIL NFR BLD: 1 % (ref 0–7)
ERYTHROCYTE [DISTWIDTH] IN BLOOD BY AUTOMATED COUNT: 15.9 % (ref 11.5–14.5)
GLOBULIN SER CALC-MCNC: 3.5 G/DL (ref 2–4)
GLUCOSE BLD STRIP.AUTO-MCNC: 108 MG/DL (ref 65–100)
GLUCOSE BLD STRIP.AUTO-MCNC: 108 MG/DL (ref 65–100)
GLUCOSE BLD STRIP.AUTO-MCNC: 112 MG/DL (ref 65–100)
GLUCOSE BLD STRIP.AUTO-MCNC: 119 MG/DL (ref 65–100)
GLUCOSE SERPL-MCNC: 87 MG/DL (ref 65–100)
HCT VFR BLD AUTO: 30.3 % (ref 36.6–50.3)
HGB BLD-MCNC: 9.7 G/DL (ref 12.1–17)
IMM GRANULOCYTES # BLD: 0.1 K/UL (ref 0–0.04)
IMM GRANULOCYTES NFR BLD AUTO: 1 % (ref 0–0.5)
LDH SERPL L TO P-CCNC: 158 U/L (ref 85–241)
LYMPHOCYTES # BLD: 0.3 K/UL (ref 0.8–3.5)
LYMPHOCYTES NFR BLD: 5 % (ref 12–49)
MAGNESIUM SERPL-MCNC: 2.1 MG/DL (ref 1.6–2.4)
MCH RBC QN AUTO: 30.3 PG (ref 26–34)
MCHC RBC AUTO-ENTMCNC: 32 G/DL (ref 30–36.5)
MCV RBC AUTO: 94.7 FL (ref 80–99)
MONOCYTES # BLD: 0.5 K/UL (ref 0–1)
MONOCYTES NFR BLD: 8 % (ref 5–13)
NEUTS SEG # BLD: 5.5 K/UL (ref 1.8–8)
NEUTS SEG NFR BLD: 85 % (ref 32–75)
NRBC # BLD: 0 K/UL (ref 0–0.01)
NRBC BLD-RTO: 0 PER 100 WBC
PHOSPHATE SERPL-MCNC: 3.6 MG/DL (ref 2.6–4.7)
PLATELET # BLD AUTO: 169 K/UL (ref 150–400)
PMV BLD AUTO: 10.4 FL (ref 8.9–12.9)
POTASSIUM SERPL-SCNC: 3.7 MMOL/L (ref 3.5–5.1)
PROT SERPL-MCNC: 6.5 G/DL (ref 6.4–8.2)
RBC # BLD AUTO: 3.2 M/UL (ref 4.1–5.7)
RBC MORPH BLD: ABNORMAL
REPORTED DOSE,DOSE: ABNORMAL UNITS
REPORTED DOSE/TIME,TMG: ABNORMAL
SERVICE CMNT-IMP: ABNORMAL
SODIUM SERPL-SCNC: 144 MMOL/L (ref 136–145)
VANCOMYCIN TROUGH SERPL-MCNC: 16.8 UG/ML (ref 5–10)
WBC # BLD AUTO: 6.5 K/UL (ref 4.1–11.1)

## 2018-10-15 PROCEDURE — 82962 GLUCOSE BLOOD TEST: CPT

## 2018-10-15 PROCEDURE — 83615 LACTATE (LD) (LDH) ENZYME: CPT | Performed by: INTERNAL MEDICINE

## 2018-10-15 PROCEDURE — 85025 COMPLETE CBC W/AUTO DIFF WBC: CPT | Performed by: INTERNAL MEDICINE

## 2018-10-15 PROCEDURE — 74011250636 HC RX REV CODE- 250/636: Performed by: INTERNAL MEDICINE

## 2018-10-15 PROCEDURE — 80202 ASSAY OF VANCOMYCIN: CPT | Performed by: INTERNAL MEDICINE

## 2018-10-15 PROCEDURE — 83735 ASSAY OF MAGNESIUM: CPT | Performed by: INTERNAL MEDICINE

## 2018-10-15 PROCEDURE — 65660000000 HC RM CCU STEPDOWN

## 2018-10-15 PROCEDURE — 80053 COMPREHEN METABOLIC PANEL: CPT | Performed by: INTERNAL MEDICINE

## 2018-10-15 PROCEDURE — 74011250637 HC RX REV CODE- 250/637: Performed by: INTERNAL MEDICINE

## 2018-10-15 PROCEDURE — 36415 COLL VENOUS BLD VENIPUNCTURE: CPT | Performed by: INTERNAL MEDICINE

## 2018-10-15 PROCEDURE — 84100 ASSAY OF PHOSPHORUS: CPT | Performed by: INTERNAL MEDICINE

## 2018-10-15 PROCEDURE — 74011000258 HC RX REV CODE- 258: Performed by: INTERNAL MEDICINE

## 2018-10-15 PROCEDURE — 74011000250 HC RX REV CODE- 250: Performed by: INTERNAL MEDICINE

## 2018-10-15 RX ORDER — FUROSEMIDE 10 MG/ML
40 INJECTION INTRAMUSCULAR; INTRAVENOUS ONCE
Status: COMPLETED | OUTPATIENT
Start: 2018-10-15 | End: 2018-10-15

## 2018-10-15 RX ORDER — FUROSEMIDE 10 MG/ML
40 INJECTION INTRAMUSCULAR; INTRAVENOUS DAILY
Status: DISCONTINUED | OUTPATIENT
Start: 2018-10-15 | End: 2018-10-17 | Stop reason: HOSPADM

## 2018-10-15 RX ADMIN — DONEPEZIL HYDROCHLORIDE 10 MG: 5 TABLET, FILM COATED ORAL at 09:58

## 2018-10-15 RX ADMIN — MEMANTINE 10 MG: 10 TABLET ORAL at 10:04

## 2018-10-15 RX ADMIN — FUROSEMIDE 40 MG: 10 INJECTION, SOLUTION INTRAMUSCULAR; INTRAVENOUS at 20:53

## 2018-10-15 RX ADMIN — PIPERACILLIN SODIUM,TAZOBACTAM SODIUM 4.5 G: 4; .5 INJECTION, POWDER, FOR SOLUTION INTRAVENOUS at 10:00

## 2018-10-15 RX ADMIN — PIPERACILLIN SODIUM,TAZOBACTAM SODIUM 4.5 G: 4; .5 INJECTION, POWDER, FOR SOLUTION INTRAVENOUS at 02:14

## 2018-10-15 RX ADMIN — PIPERACILLIN SODIUM,TAZOBACTAM SODIUM 4.5 G: 4; .5 INJECTION, POWDER, FOR SOLUTION INTRAVENOUS at 17:36

## 2018-10-15 RX ADMIN — FUROSEMIDE 40 MG: 10 INJECTION, SOLUTION INTRAMUSCULAR; INTRAVENOUS at 16:10

## 2018-10-15 RX ADMIN — POLYETHYLENE GLYCOL 3350 17 G: 17 POWDER, FOR SOLUTION ORAL at 10:01

## 2018-10-15 RX ADMIN — FENOFIBRATE 48 MG: 48 TABLET ORAL at 09:58

## 2018-10-15 RX ADMIN — CLOPIDOGREL BISULFATE 75 MG: 75 TABLET ORAL at 09:58

## 2018-10-15 RX ADMIN — CILOSTAZOL 100 MG: 100 TABLET ORAL at 16:11

## 2018-10-15 RX ADMIN — CITALOPRAM HYDROBROMIDE 20 MG: 20 TABLET ORAL at 09:58

## 2018-10-15 NOTE — PROGRESS NOTES
[facility] Name: Brittni Holman MRN: [id number] : 1931 Hospital: [facility] Date: 10/15/2018 IMPRESSION:  
· Acute respiratory failure · CHF, EF 20% · Pulmonary edema · Bilateral pleural effusions RECOMMENDATIONS:  
· Wean O2 
· Doubt pneumonia, afebrile, normal WBC count, narrow abx?? 
· Needs diuretics · I would hold off thoracentesis and see if diuretics will help resolving bilateral pleural effusions · DNR 
· Nothing more to add, will sign off Subjective: No acute events overnight No acute distress No acute complaints Current Facility-Administered Medications Medication Dose Route Frequency  influenza vaccine - (6 mos+)(PF) (FLUARIX QUAD/FLULAVAL QUAD) injection 0.5 mL  0.5 mL IntraMUSCular PRIOR TO DISCHARGE  VANCOMYCIN TROUGH LEVEL - BEFORE 8 AM DOSE   1 Each Other ONCE  
 vancomycin (VANCOCIN) 1,000 mg in 0.9% sodium chloride (MBP/ADV) 250 mL  1,000 mg IntraVENous Q18H  citalopram (CELEXA) tablet 20 mg  20 mg Oral DAILY  clopidogrel (PLAVIX) tablet 75 mg  75 mg Oral DAILY  cilostazol (PLETAL) tablet 100 mg  100 mg Oral ACB&D  
 fenofibrate nanocrystallized (TRICOR) tablet 48 mg  48 mg Oral DAILY  donepezil (ARICEPT) tablet 10 mg  10 mg Oral DAILY  memantine (NAMENDA) tablet 10 mg  10 mg Oral BID  insulin lispro (HUMALOG) injection   SubCUTAneous AC&HS  polyethylene glycol (MIRALAX) packet 17 g  17 g Oral DAILY  piperacillin-tazobactam (ZOSYN) 4.5 g in 0.9% sodium chloride (MBP/ADV) 100 mL  4.5 g IntraVENous Q8H Review of Systems: A comprehensive review of systems was negative except for: Respiratory: positive for dyspnea on exertion Objective:  
Vital Signs:   
Visit Vitals  /69 (BP 1 Location: Right arm, BP Patient Position: At rest)  Pulse (!) 113  Temp 98.6 °F (37 °C)  Resp 18  Ht 6' 2\" (1.88 m)  Wt 74.1 kg (163 lb 5.8 oz)  SpO2 95%  BMI 20.97 kg/m2 O2 Device: Room air O2 Flow Rate (L/min): 2 l/min Temp (24hrs), Av.9 °F (36.6 °C), Min:96.3 °F (35.7 °C), Max:98.6 °F (37 °C) Intake/Output:  
Last shift:        
Last 3 shifts: 10/13 1901 - 10/15 0700 In: 80 [P.O.:480; I.V.:100] Out: 1 Intake/Output Summary (Last 24 hours) at 10/15/18 2884 Last data filed at 10/15/18 5124 Gross per 24 hour Intake              480 ml Output                0 ml Net              480 ml Physical Exam:  
General:  Alert, cooperative, no distress, appears stated age. Head:  Normocephalic, without obvious abnormality, atraumatic. Eyes:  Conjunctivae/corneas clear. PERRL, EOMs intact. Nose: Nares normal. Septum midline. Mucosa normal. No drainage or sinus tenderness. Throat: Lips, mucosa, and tongue normal. Teeth and gums normal.  
Neck: Supple, symmetrical, trachea midline, no adenopathy, thyroid: no enlargment/tenderness/nodules, no carotid bruit and no JVD. Back:   Symmetric, no curvature. ROM normal.  
Lungs:   Clear to auscultation bilaterally. Decreased BS both bases Chest wall:  No tenderness or deformity. Heart:  Regular rate and rhythm, S1, S2 normal, no murmur, click, rub or gallop. Abdomen:   Soft, non-tender. Bowel sounds normal. No masses,  No organomegaly. Extremities: Extremities normal, atraumatic, no cyanosis or edema. Pulses: 2+ and symmetric all extremities. Skin: Skin color, texture, turgor normal. No rashes or lesions Lymph nodes: Cervical, supraclavicular, and axillary nodes normal.  
Neurologic: Grossly nonfocal  
 
Data review:  
 
Recent Results (from the past 24 hour(s)) GLUCOSE, POC Collection Time: 10/14/18 11:21 AM  
Result Value Ref Range Glucose (POC) 137 (H) 65 - 100 mg/dL Performed by Jatinder Dykes GLUCOSE, POC Collection Time: 10/14/18  4:22 PM  
Result Value Ref Range Glucose (POC) 151 (H) 65 - 100 mg/dL Performed by Martina Killian (PCT) GLUCOSE, POC Collection Time: 10/14/18 10:46 PM  
Result Value Ref Range Glucose (POC) 104 (H) 65 - 100 mg/dL Performed by Daina Mao CBC WITH AUTOMATED DIFF Collection Time: 10/15/18  5:06 AM  
Result Value Ref Range WBC 6.5 4.1 - 11.1 K/uL  
 RBC 3.20 (L) 4.10 - 5.70 M/uL HGB 9.7 (L) 12.1 - 17.0 g/dL HCT 30.3 (L) 36.6 - 50.3 % MCV 94.7 80.0 - 99.0 FL  
 MCH 30.3 26.0 - 34.0 PG  
 MCHC 32.0 30.0 - 36.5 g/dL  
 RDW 15.9 (H) 11.5 - 14.5 % PLATELET 281 232 - 480 K/uL MPV 10.4 8.9 - 12.9 FL  
 NRBC 0.0 0  WBC ABSOLUTE NRBC 0.00 0.00 - 0.01 K/uL NEUTROPHILS 85 (H) 32 - 75 % LYMPHOCYTES 5 (L) 12 - 49 % MONOCYTES 8 5 - 13 % EOSINOPHILS 1 0 - 7 % BASOPHILS 0 0 - 1 % IMMATURE GRANULOCYTES 1 (H) 0.0 - 0.5 % ABS. NEUTROPHILS 5.5 1.8 - 8.0 K/UL  
 ABS. LYMPHOCYTES 0.3 (L) 0.8 - 3.5 K/UL  
 ABS. MONOCYTES 0.5 0.0 - 1.0 K/UL  
 ABS. EOSINOPHILS 0.1 0.0 - 0.4 K/UL  
 ABS. BASOPHILS 0.0 0.0 - 0.1 K/UL  
 ABS. IMM. GRANS. 0.1 (H) 0.00 - 0.04 K/UL  
 DF AUTOMATED    
 RBC COMMENTS NORMOCYTIC, NORMOCHROMIC METABOLIC PANEL, COMPREHENSIVE Collection Time: 10/15/18  5:06 AM  
Result Value Ref Range Sodium 144 136 - 145 mmol/L Potassium 3.7 3.5 - 5.1 mmol/L Chloride 111 (H) 97 - 108 mmol/L  
 CO2 22 21 - 32 mmol/L Anion gap 11 5 - 15 mmol/L Glucose 87 65 - 100 mg/dL BUN 45 (H) 6 - 20 MG/DL Creatinine 1.28 0.70 - 1.30 MG/DL  
 BUN/Creatinine ratio 35 (H) 12 - 20 GFR est AA >60 >60 ml/min/1.73m2 GFR est non-AA 53 (L) >60 ml/min/1.73m2 Calcium 8.2 (L) 8.5 - 10.1 MG/DL Bilirubin, total 0.9 0.2 - 1.0 MG/DL  
 ALT (SGPT) 35 12 - 78 U/L  
 AST (SGOT) 22 15 - 37 U/L Alk. phosphatase 63 45 - 117 U/L Protein, total 6.5 6.4 - 8.2 g/dL Albumin 3.0 (L) 3.5 - 5.0 g/dL Globulin 3.5 2.0 - 4.0 g/dL A-G Ratio 0.9 (L) 1.1 - 2.2 MAGNESIUM  Collection Time: 10/15/18  5:06 AM  
 Result Value Ref Range Magnesium 2.1 1.6 - 2.4 mg/dL PHOSPHORUS Collection Time: 10/15/18  5:06 AM  
Result Value Ref Range Phosphorus 3.6 2.6 - 4.7 MG/DL  
LD Collection Time: 10/15/18  5:06 AM  
Result Value Ref Range  85 - 241 U/L Imaging: 
I have personally reviewed the patients radiographs and have reviewed the reports: 
  
 
  
Breanna Bryant MD

## 2018-10-15 NOTE — PROGRESS NOTES
10/14/2018 8:57 PM 
 
Admit Date: 10/12/2018 Admit Diagnosis:  
Sepsis ([address] Subjective:  
 
Gonzaol Verdin remains somnolent, appears comfortable. Current Facility-Administered Medications Medication Dose Route Frequency  influenza vaccine 2018-19 (6 mos+)(PF) (FLUARIX QUAD/FLULAVAL QUAD) injection 0.5 mL  0.5 mL IntraMUSCular PRIOR TO DISCHARGE  
 [START ON 10/15/2018] VANCOMYCIN TROUGH LEVEL - BEFORE 8 AM DOSE   1 Each Other ONCE  
 vancomycin (VANCOCIN) 1,000 mg in 0.9% sodium chloride (MBP/ADV) 250 mL  1,000 mg IntraVENous Q18H  
 heparin (porcine) injection 5,000 Units  5,000 Units SubCUTAneous Q12H  citalopram (CELEXA) tablet 20 mg  20 mg Oral DAILY  clopidogrel (PLAVIX) tablet 75 mg  75 mg Oral DAILY  cilostazol (PLETAL) tablet 100 mg  100 mg Oral ACB&D  
 fenofibrate nanocrystallized (TRICOR) tablet 48 mg  48 mg Oral DAILY  donepezil (ARICEPT) tablet 10 mg  10 mg Oral DAILY  memantine (NAMENDA) tablet 10 mg  10 mg Oral BID  
 glucose chewable tablet 16 g  4 Tab Oral PRN  
 dextrose (D50W) injection syrg 12.5-25 g  25-50 mL IntraVENous PRN  
 glucagon (GLUCAGEN) injection 1 mg  1 mg IntraMUSCular PRN  
 insulin lispro (HUMALOG) injection   SubCUTAneous AC&HS  polyethylene glycol (MIRALAX) packet 17 g  17 g Oral DAILY  piperacillin-tazobactam (ZOSYN) 4.5 g in 0.9% sodium chloride (MBP/ADV) 100 mL  4.5 g IntraVENous Q8H Objective:  
  
Physical Exam:   
Visit Vitals  /77 (BP 1 Location: Right arm, BP Patient Position: At rest)  Pulse (!) 110  Temp 98.6 °F (37 °C)  Resp 18  Ht 6' 2\" (1.88 m)  Wt 163 lb 5.8 oz (74.1 kg)  SpO2 95%  BMI 20.97 kg/m2 Gen: Somnolent, appears comfortable Chest and Lung Exam  
Inspection: Accessory muscles - No use of accessory muscles in breathing. Auscultation:  
Breath sounds: -Severely decreased on the right Cardiovascular Inspection: Jugular vein - Bilateral - Inspection Normal.  
 Palpation/Percussion:  
Apical Impulse: - Normal.  
Auscultation: Rhythm - Regular. Heart Sounds - S1 WNL and S2 WNL. No S3 or S4. Murmurs & Other Heart Sounds: Auscultation of the heart reveals - No Murmurs. Peripheral Vascular Upper Extremity: Inspection - Bilateral - No Cyanotic nailbeds or Digital clubbing. Lower Extremity:  
Palpation: Edema - Bilateral - No edema. Abdomen:   Soft, non-tender, bowel sounds are active. Neuro: A&O times 3, CN and motor grossly WNL Data Review:  
Recent Labs 10/14/18 
 0324  10/12/18 
 1738 WBC  7.5  5.9 HGB  10.1*  10.3* HCT  32.8*  32.1*  
PLT  203  181 Recent Labs 10/14/18 
 0324  10/13/18 
 0400  10/12/18 
 1738 NA  146*  147*  145  
K  4.2  4.5  4.9  
CL  113*  111*  111* CO2  22  22  29 GLU  104*  113*  108* BUN  47*  43*  42* CREA  1.21  1.29  1.29  
CA  8.1*  8.4*  8.8 MG  2.1   --   2.1 PHOS  4.7   --    --   
ALB  3.1*   --   3.5 TBILI  1.0   --   0.6 SGOT  29   --   19 ALT  39   --   25 INR   --    --   1.1 Recent Labs 10/14/18 
 0324  10/12/18 
 1738 TROIQ  0.10*  0.09* Intake/Output Summary (Last 24 hours) at 10/14/18 2057 Last data filed at 10/14/18 1347 Gross per 24 hour Intake              240 ml Output                1 ml Net              239 ml Telemetry: Probable atypical atrial flutter Echo: SUMMARY: 
Left ventricle: Systolic function was severely reduced. Ejection fraction 
was estimated to be 20 %. There was moderate diffuse hypokinesis. Right ventricle: The size was at the upper limits of normal. Systolic 
function was moderately reduced. Left atrium: The atrium was severely dilated. Mitral valve: There was moderate to severe regurgitation. PISA 
calculations were not performed. Tricuspid valve: There was moderate regurgitation. There was severe 
pulmonary hypertension. Assessment:  
 
Active Problems: COPD with acute bronchitis ([address] (4/9/2011) Coronary atherosclerosis of native coronary artery (4/9/2011) DM (diabetes mellitus) ([facility] (4/9/2011) Dementia (4/9/2011) DNR (do not resuscitate) (12/30/2012) HTN (hypertension) (12/30/2012) Sepsis ([facility] (10/13/2018) H/O: CVA (cerebrovascular accident) (10/13/2018) CAD (coronary artery disease) (1/1/2000) Overview: Reported MI 2000 Plan:  
 
81 y/o admitted with sepsis due to suspected PNA, probable atypical atrial flutter on EKG.   
Probable atypical A. Flutter: 
· Suspected flutter waves are tiny · Check TSH.  K and Mag OK · Continue plavix for now, rate control not bad · Hold off on BB with intermitten hypotension · CHADS VASC score is high but so is fall risk · No indication for cardioversion as no clear symptoms · Need to know quality of life, fall history to determine aggressiveness of care 
  
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 We will discuss further with Dr. Octavio Merlin and family.

## 2018-10-15 NOTE — PROGRESS NOTES
11: 40AM 
 
Reason for Admission:   Sepsis RRAT Score:     23 Resources/supports as identified by patient/family:   Family and LTC facility support Top Challenges facing patient (as identified by patient/family and CM): Finances/Medication cost?      No needs identified Transportation? No needs identified Support system or lack thereof? Family and LTC facility support Living arrangements? [facility] Self-care/ADLs/Cognition? Dependent/Disoriented Current Advanced Directive/Advance Care Plan: On file/DNR Plan for utilizing home health:    None Likelihood of readmission: High  
              
Transition of Care Plan:              LTC at [facility] from [place] [phone]  [phone]), CM at the [facility] where pt is a long-term resident in their memory support unit. Per [place], pt is total care. He was able to sit up in a WC prior to admission, but with poor balance. Plan for pt to d/c back to [facility] when medically stable. CM will continue to follow and assist with d/c planning. Care Management Interventions PCP Verified by CM: Yes Mode of Transport at Discharge: BLS Transition of Care Consult (CM Consult): [address] Discharge Durable Medical Equipment: No 
Physical Therapy Consult: No 
Occupational Therapy Consult: No 
Speech Therapy Consult: No 
Current Support Network: Lives with Spouse, Own Home Confirm Follow Up Transport: Family Plan discussed with Pt/Family/Caregiver: Yes Freedom of Choice Offered: Yes Discharge Location Discharge Placement: Skilled nursing facility ALETA Lin Care Manager

## 2018-10-15 NOTE — PROGRESS NOTES
[address]  [phone] Cardiology Progress Note 10/15/2018 2:30PM 
 
Admit Date: 10/12/2018 Admit Diagnosis:  
Sepsis ([address] Subjective:  
 
Muriel  is asleep, not awakening to voice. No thoracentesis today per Pulmonary - wanting to see if diuresis effective. Visit Vitals  /69  Pulse (!) 113  Temp 98.6 °F (37 °C)  Resp 18  Ht 6' 2\" (1.88 m)  Wt 74.1 kg (163 lb 5.8 oz)  SpO2 91%  BMI 20.97 kg/m2 Current Facility-Administered Medications Medication Dose Route Frequency  influenza vaccine - (6 mos+)(PF) (FLUARIX QUAD/FLULAVAL QUAD) injection 0.5 mL  0.5 mL IntraMUSCular PRIOR TO DISCHARGE  citalopram (CELEXA) tablet 20 mg  20 mg Oral DAILY  clopidogrel (PLAVIX) tablet 75 mg  75 mg Oral DAILY  cilostazol (PLETAL) tablet 100 mg  100 mg Oral ACB&D  
 fenofibrate nanocrystallized (TRICOR) tablet 48 mg  48 mg Oral DAILY  donepezil (ARICEPT) tablet 10 mg  10 mg Oral DAILY  memantine (NAMENDA) tablet 10 mg  10 mg Oral BID  
 glucose chewable tablet 16 g  4 Tab Oral PRN  
 dextrose (D50W) injection syrg 12.5-25 g  25-50 mL IntraVENous PRN  
 glucagon (GLUCAGEN) injection 1 mg  1 mg IntraMUSCular PRN  
 insulin lispro (HUMALOG) injection   SubCUTAneous AC&HS  polyethylene glycol (MIRALAX) packet 17 g  17 g Oral DAILY  piperacillin-tazobactam (ZOSYN) 4.5 g in 0.9% sodium chloride (MBP/ADV) 100 mL  4.5 g IntraVENous Q8H Objective:  
  
Physical Exam: 
General Appearance:  eldelry  female in no acute distress Chest:   diminished Cardiovascular:  Regular rate and rhythm, no murmur.  
Abdomen:   Soft, non-tender, bowel sounds are active.  
Extremities: no edema Skin:  Warm and dry.  
 
Data Review:  
Recent Labs 10/15/18 
 5160  10/14/18 
 0324  10/12/18 
 1738 WBC  6.5  7.5  5.9 HGB  9.7*  10.1*  10.3* HCT  30.3*  32.8*  32.1*  
PLT  169  203  181 Recent Labs 10/15/18 
 0506  10/14/18 
 0324  10/13/18 
 0400  10/12/18 
 1738 NA  144  146*  147*  145  
K  3.7  4.2  4.5  4.9  
CL  111*  113*  111*  111* CO2  22  22  22  29 GLU  87  104*  113*  108* BUN  45*  47*  43*  42* CREA  1.28  1.21  1.29  1.29  
CA  8.2*  8.1*  8.4*  8.8 MG  2.1  2.1   --   2.1 PHOS  3.6  4.7   --    --   
ALB  3.0*  3.1*   --   3.5 TBILI  0.9  1.0   --   0.6 SGOT  22  29   --   19 ALT  35  39   --   25 INR   --    --    --   1.1 Recent Labs 10/14/18 
 0324  10/12/18 
 1738 TROIQ  0.10*  0.09* Intake/Output Summary (Last 24 hours) at 10/15/18 1642 Last data filed at 10/15/18 1125 Gross per 24 hour Intake              240 ml Output                0 ml Net              240 ml Telemetry: SR 
 
 
Assessment:  
 
Active Problems: COPD with acute bronchitis ([facility] (4/9/2011) Coronary atherosclerosis of native coronary artery (4/9/2011) DM (diabetes mellitus) ([facility] (4/9/2011) Dementia (4/9/2011) DNR (do not resuscitate) (12/30/2012) HTN (hypertension) (12/30/2012) Sepsis ([facility] (10/13/2018) H/O: CVA (cerebrovascular accident) (10/13/2018) CAD (coronary artery disease) (1/1/2000) Overview: Reported MI 2000 Plan:  
 
Probable atypical A. Flutter: 
May be small flutter ways TSH normal, Mg and K+ stable, 
Continue on Plavix, no BB due to hypotension If flutter, CHADS2 vasc score high but not a candidate for long term DOAC - fall risk Bilateral Pleural Effusions; R>L- per IM, continue diuresing 
  
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 
Diurese, no I/Os, daily weights- lasix added with parameters to hold Hold BB/ ACEI with sepsis and borderline low BP, continue diuresis 
  
Advanced dementia Long and short-term prognosis is severely guarded. Also noted is discussion of possible hospice with the patient's daughter which seems appropriate Dr. Marisa Carl to have discussion with family reference aggressiveness of care Elder Keating ACNP Cardiology Patient seen and examined by me with nurse practitioner Elder Keating. I personally performed all components of the history, physical, and medical decision making and agree with the assessment and plan with minor modifications as noted.

## 2018-10-15 NOTE — PROGRESS NOTES
Hospitalist Progress Note NAME: Venita Wharton :  1931 MRN:  [id number] Assessment / Plan: 
Acute Systolic Congestive Heart Failure (POA) Moderate to Severe Mitral Regurgitation Atrial Fibrillation/Flutter with RVR 
-TFT's wnl 
-IV lasix again today 
-discussed these issues with Dr. Remigio Sigala and patient's daughter 
-will try to optimize patient's breathing while here but otherwise will plan of Hospice 
-Daughter, Kentrell Adames, plans to reach out to Hospice at the [address] [place] [address] placed 
-Planning for possible discharge on Wednesday to [facility] with Hospice Acute hypoxic respiratory failure Suspected Severe Sepsis from Pneumonia: can not rule out HCAP Bilateral Pleural Effusions; R>L Elevated Lactic Acid: 2.1; improved 
-see CXR and CT Chest results 
-IVF's stopped on 10/13 with concern for pulmonary edema 
-TTE as below 
-serial troponin up to 0.1 
-Pulmonary following; Thoracentesis canceled; daughter aware Hypotension on presentation: BP was 84/58 
-improved; pending BP's later today will consider IV diuretic 
  
History of Type II DM 
-continue SSI and check A1c 5.0 
  
Hypernatremia: mild 
-wnl today Advanced Alzheimer's dementia 
-cont namenda, aricept 
  
Constipation 
-miralax, soap suds enema Hyperlipidemia: 
-controlled with fibrate  
 
Code Status: DNR Surrogate Decision Maker: Daughter 
  
DVT Prophylaxis: sq heparin GI Prophylaxis: not indicated 
  
Baseline: Mostly Wheelchair bound at [facility]) Dispo: [facility] with Hospice; CHF will be patient's hospice diagnosis Subjective: Chief Complaint / Reason for Physician Visit: follow-up respiratory failure Patient Seen for follow-up Poor historian due to dementia Case discussed with RN Review of Systems: 
Symptom Y/N Comments  Symptom Y/N Comments Fever/Chills    Chest Pain Poor Appetite    Edema Cough    Abdominal Pain Sputum    Joint Pain SOB/GORDILLO    Pruritis/Rash Nausea/vomit    Tolerating PT/OT Diarrhea    Tolerating Diet Constipation    Other Could NOT obtain due to: dementia Objective: VITALS:  
Last 24hrs VS reviewed since prior progress note. Most recent are: 
Patient Vitals for the past 24 hrs: 
 Temp Pulse Resp BP SpO2  
10/15/18 0800 98.6 °F (37 °C) (!) 115 18 110/74 91 % 10/15/18 0400 98.6 °F (37 °C) (!) 113 18 105/69 95 % 10/15/18 0000 98.5 °F (36.9 °C) (!) 113 20 96/66 96 % 10/14/18 2000 98.6 °F (37 °C) (!) 110 18 109/77 95 % Intake/Output Summary (Last 24 hours) at 10/15/18 1448 Last data filed at 10/15/18 2198 Gross per 24 hour Intake              240 ml Output                0 ml Net              240 ml PHYSICAL EXAM: 
General: WD, Elderly. Sedated, no acute distress   
EENT:  Pupils reactive. Anicteric sclerae. MM dry Resp:  No wheezing on anterior assessment. No accessory muscle use CV:  Irregular  Rhythm with mild tachycardia,  No edema GI:  Soft, Non distended, Non tender.  +Bowel sounds Neurologic:  Speaks some, follows simple commands, moves all extremities Psych:   Poor Insight. Not anxious nor agitated Skin:  No rashes noted. No jaundice Reviewed most current lab test results and cultures  YES Reviewed most current radiology test results   YES Review and summation of old records today    NO Reviewed patient's current orders and MAR    YES 
PMH/SH reviewed - no change compared to H&P 
________________________________________________________________________ Care Plan discussed with: 
  Comments Patient x Family  x   
RN x Care Manager Consultant  x Dr. Mikayla Layne Multidiciplinary team rounds were held today with , nursing, pharmacist and clinical coordinator. Patient's plan of care was discussed; medications were reviewed and discharge planning was addressed. ________________________________________________________________________ Total NON critical care TIME:  35   Minutes Total CRITICAL CARE TIME Spent:   Minutes non procedure based Comments >50% of visit spent in counseling and coordination of care x   
________________________________________________________________________ Nesha Colindres MD  
 
Procedures: see electronic medical records for all procedures/Xrays and details which were not copied into this note but were reviewed prior to creation of Plan. LABS: 
I reviewed today's most current labs and imaging studies. Pertinent labs include: 
Recent Labs 10/15/18 
 5482  10/14/18 
 0324  10/12/18 
 1738 WBC  6.5  7.5  5.9 HGB  9.7*  10.1*  10.3* HCT  30.3*  32.8*  32.1*  
PLT  169  203  181 Recent Labs 10/15/18 
 0506  10/14/18 
 0324  10/13/18 
 0400  10/12/18 
 1738 NA  144  146*  147*  145  
K  3.7  4.2  4.5  4.9  
CL  111*  113*  111*  111* CO2  22  22  22  29 GLU  87  104*  113*  108* BUN  45*  47*  43*  42* CREA  1.28  1.21  1.29  1.29  
CA  8.2*  8.1*  8.4*  8.8 MG  2.1  2.1   --   2.1 PHOS  3.6  4.7   --    --   
ALB  3.0*  3.1*   --   3.5 TBILI  0.9  1.0   --   0.6 SGOT  22  29   --   19 ALT  35  39   --   25 INR   --    --    --   1.1 Signed: Nesha Colindres MD

## 2018-10-15 NOTE — PROGRESS NOTES
Bedside shift change report given to Jamal Beasley (oncoming nurse) by Adriane Lovelace (offgoing nurse). Report included the following information SBAR and ED Summary. No acute events during shift

## 2018-10-16 ENCOUNTER — APPOINTMENT (OUTPATIENT)
Dept: GENERAL RADIOLOGY | Age: 83
DRG: 871 | End: 2018-10-16
Attending: INTERNAL MEDICINE
Payer: MEDICARE

## 2018-10-16 LAB
ALBUMIN SERPL-MCNC: 3 G/DL (ref 3.5–5)
ALBUMIN/GLOB SERPL: 0.8 {RATIO} (ref 1.1–2.2)
ALP SERPL-CCNC: 63 U/L (ref 45–117)
ALT SERPL-CCNC: 39 U/L (ref 12–78)
ANION GAP SERPL CALC-SCNC: 13 MMOL/L (ref 5–15)
AST SERPL-CCNC: 26 U/L (ref 15–37)
BASOPHILS # BLD: 0 K/UL (ref 0–0.1)
BASOPHILS NFR BLD: 0 % (ref 0–1)
BILIRUB SERPL-MCNC: 0.9 MG/DL (ref 0.2–1)
BUN SERPL-MCNC: 36 MG/DL (ref 6–20)
BUN/CREAT SERPL: 31 (ref 12–20)
CALCIUM SERPL-MCNC: 8.4 MG/DL (ref 8.5–10.1)
CHLORIDE SERPL-SCNC: 108 MMOL/L (ref 97–108)
CO2 SERPL-SCNC: 23 MMOL/L (ref 21–32)
CREAT SERPL-MCNC: 1.18 MG/DL (ref 0.7–1.3)
DIFFERENTIAL METHOD BLD: ABNORMAL
EOSINOPHIL # BLD: 0.1 K/UL (ref 0–0.4)
EOSINOPHIL NFR BLD: 1 % (ref 0–7)
ERYTHROCYTE [DISTWIDTH] IN BLOOD BY AUTOMATED COUNT: 16 % (ref 11.5–14.5)
GLOBULIN SER CALC-MCNC: 3.9 G/DL (ref 2–4)
GLUCOSE BLD STRIP.AUTO-MCNC: 116 MG/DL (ref 65–100)
GLUCOSE BLD STRIP.AUTO-MCNC: 135 MG/DL (ref 65–100)
GLUCOSE BLD STRIP.AUTO-MCNC: 159 MG/DL (ref 65–100)
GLUCOSE SERPL-MCNC: 122 MG/DL (ref 65–100)
HCT VFR BLD AUTO: 31.1 % (ref 36.6–50.3)
HGB BLD-MCNC: 10 G/DL (ref 12.1–17)
IMM GRANULOCYTES # BLD: 0 K/UL (ref 0–0.04)
IMM GRANULOCYTES NFR BLD AUTO: 1 % (ref 0–0.5)
LYMPHOCYTES # BLD: 0.5 K/UL (ref 0.8–3.5)
LYMPHOCYTES NFR BLD: 7 % (ref 12–49)
MCH RBC QN AUTO: 30.4 PG (ref 26–34)
MCHC RBC AUTO-ENTMCNC: 32.2 G/DL (ref 30–36.5)
MCV RBC AUTO: 94.5 FL (ref 80–99)
MONOCYTES # BLD: 0.6 K/UL (ref 0–1)
MONOCYTES NFR BLD: 9 % (ref 5–13)
NEUTS SEG # BLD: 5.7 K/UL (ref 1.8–8)
NEUTS SEG NFR BLD: 83 % (ref 32–75)
NRBC # BLD: 0 K/UL (ref 0–0.01)
NRBC BLD-RTO: 0 PER 100 WBC
PLATELET # BLD AUTO: 188 K/UL (ref 150–400)
PMV BLD AUTO: 10.9 FL (ref 8.9–12.9)
POTASSIUM SERPL-SCNC: 3.3 MMOL/L (ref 3.5–5.1)
PROT SERPL-MCNC: 6.9 G/DL (ref 6.4–8.2)
RBC # BLD AUTO: 3.29 M/UL (ref 4.1–5.7)
SERVICE CMNT-IMP: ABNORMAL
SODIUM SERPL-SCNC: 144 MMOL/L (ref 136–145)
WBC # BLD AUTO: 6.8 K/UL (ref 4.1–11.1)

## 2018-10-16 PROCEDURE — 80053 COMPREHEN METABOLIC PANEL: CPT | Performed by: INTERNAL MEDICINE

## 2018-10-16 PROCEDURE — 82962 GLUCOSE BLOOD TEST: CPT

## 2018-10-16 PROCEDURE — 65660000000 HC RM CCU STEPDOWN

## 2018-10-16 PROCEDURE — 74011250636 HC RX REV CODE- 250/636: Performed by: INTERNAL MEDICINE

## 2018-10-16 PROCEDURE — 36415 COLL VENOUS BLD VENIPUNCTURE: CPT | Performed by: INTERNAL MEDICINE

## 2018-10-16 PROCEDURE — 74011000258 HC RX REV CODE- 258: Performed by: INTERNAL MEDICINE

## 2018-10-16 PROCEDURE — 71045 X-RAY EXAM CHEST 1 VIEW: CPT

## 2018-10-16 PROCEDURE — 85025 COMPLETE CBC W/AUTO DIFF WBC: CPT | Performed by: INTERNAL MEDICINE

## 2018-10-16 PROCEDURE — 74011250637 HC RX REV CODE- 250/637: Performed by: INTERNAL MEDICINE

## 2018-10-16 RX ADMIN — CLOPIDOGREL BISULFATE 75 MG: 75 TABLET ORAL at 08:53

## 2018-10-16 RX ADMIN — FENOFIBRATE 48 MG: 48 TABLET ORAL at 08:53

## 2018-10-16 RX ADMIN — DONEPEZIL HYDROCHLORIDE 10 MG: 5 TABLET, FILM COATED ORAL at 08:53

## 2018-10-16 RX ADMIN — CILOSTAZOL 100 MG: 100 TABLET ORAL at 18:32

## 2018-10-16 RX ADMIN — MEMANTINE 10 MG: 10 TABLET ORAL at 08:53

## 2018-10-16 RX ADMIN — MEMANTINE 10 MG: 10 TABLET ORAL at 21:00

## 2018-10-16 RX ADMIN — PIPERACILLIN SODIUM,TAZOBACTAM SODIUM 4.5 G: 4; .5 INJECTION, POWDER, FOR SOLUTION INTRAVENOUS at 18:24

## 2018-10-16 RX ADMIN — PIPERACILLIN SODIUM,TAZOBACTAM SODIUM 4.5 G: 4; .5 INJECTION, POWDER, FOR SOLUTION INTRAVENOUS at 09:00

## 2018-10-16 RX ADMIN — PIPERACILLIN SODIUM,TAZOBACTAM SODIUM 4.5 G: 4; .5 INJECTION, POWDER, FOR SOLUTION INTRAVENOUS at 02:03

## 2018-10-16 RX ADMIN — CITALOPRAM HYDROBROMIDE 20 MG: 20 TABLET ORAL at 08:53

## 2018-10-16 RX ADMIN — FUROSEMIDE 40 MG: 10 INJECTION, SOLUTION INTRAMUSCULAR; INTRAVENOUS at 08:54

## 2018-10-16 RX ADMIN — CILOSTAZOL 100 MG: 100 TABLET ORAL at 08:53

## 2018-10-16 NOTE — PROGRESS NOTES
PCU SHIFT NURSING NOTE Bedside and Verbal shift change report given to Ravi Crouch RN (oncoming nurse) by Jerald Little RN (offgoing nurse). Report included the following information SBAR, Kardex, MAR and Recent Results. Shift Summary:  
4460: Bedside and Verbal shift change report given to Vimal Borges RN (oncoming nurse) by Ravi Crouch RN (offgoing nurse). Report included the following information SBAR, Kardex, MAR and Recent Results. Admission Date 10/12/2018 Admission Diagnosis Sepsis ([address] Consults IP CONSULT TO CARDIOLOGY 
IP CONSULT TO INTERVENTIONAL RADIOLOGY 
IP CONSULT TO INTERVENTIONAL RADIOLOGY 
IP CONSULT TO PULMONOLOGY Consults []PT []OT []Speech  
[]Case Management  
  
[] Palliative Cardiac Monitoring Order []Yes []No  
 
IV drips []Yes Drip:                            Dose: 
Drip:                            Dose: 
Drip:                            Dose:  
[]No  
 
GI Prophylaxis []Yes []No  
 
 
 
DVT Prophylaxis SCDs:     
     
 Arvin stockings:     
  
[] Medication []Contraindicated []None Activity Level Activity Level: Bed Rest   
 Activity Assistance: Complete care Purposeful Rounding every 1-2 hour? []Yes Mehta Score  Total Score: 3 Bed Alarm (If score 3 or >) []Yes  
[] Refused (See signed refusal form in chart) Theodore Score  Theodore Score: 11 Theodore Score (if score 14 or less) []PMT consult  
[]Wound Care consult []Specialty bed  
[] Nutrition consult Needs prior to discharge:  
Home O2 required:   
[]Yes []No  
 If yes, how much O2 required? Other:  
 Last Bowel Movement: Last Bowel Movement Date: 10/15/18 Influenza Vaccine Received Flu Vaccine for Current Season (usually Sept-March): No  
 Patient/Guardian Refused (Notify MD): No  
Pneumonia Vaccine Diet Active Orders Diet DIET MECHANICAL SOFT  
  
LDAs Peripheral IV 10/13/18 Right Forearm (Active) Site Assessment Clean, dry, & intact 10/16/2018  3:01 AM  
Phlebitis Assessment 0 10/16/2018  3:01 AM  
Infiltration Assessment 0 10/16/2018  3:01 AM  
Dressing Status Clean, dry, & intact 10/16/2018  3:01 AM  
Dressing Type Transparent 10/16/2018  3:01 AM  
Hub Color/Line Status Pink 10/16/2018  3:01 AM  
Alcohol Cap Used Yes 10/15/2018  8:00 PM  
      
Condom Catheter 10/15/18 (Active) Indications for Use Acute urinary retention/bladder outlet obstruction 10/16/2018  3:01 AM  
Status Draining 10/16/2018  3:01 AM  
Site Condition No abnormalities 10/16/2018  3:01 AM  
Drainage Tube Clipped to Bed Yes 10/16/2018  3:01 AM  
Catheter Secured to Thigh No 10/16/2018  3:01 AM  
Tamper Seal Intact Yes 10/16/2018  3:01 AM  
Bag Below Bladder/Not on Floor Yes 10/16/2018  3:01 AM  
Lack of Dependent Loop in Tubing Yes 10/16/2018  3:01 AM  
Drainage Bag Less Than Half Full Yes 10/16/2018  3:01 AM  
Sterile Solution Used for  Irrigation N/A 10/16/2018  3:01 AM  
Urine Output (mL) 525 ml 10/16/2018  3:01 AM  
            
Urinary Catheter Condom Catheter 10/15/18-Indications for Use: Acute urinary retention/bladder outlet obstruction Intake & Output Date 10/15/18 0700 - 10/16/18 5733 10/16/18 0700 - 10/17/18 6068 Shift [id number] 1900-0659 24 Hour Total 6622-2512 4719-8382 24 Hour Total  
I 
N 
T 
A 
K 
E 
 I.V. 
(mL/kg/hr)  700 
(0.9) 700 
(0.4) Volume (piperacillin-tazobactam (ZOSYN) 4.5 g in 0.9% sodium chloride (MBP/ADV) 100 mL)  700 700 Shift Total 
(mL/kg)  700 
(10.3) 700 
(10.3) O 
U T 
P 
U Han Manjit Urine (mL/kg/hr)  1250 
(1.5) 1250 
(0.8) Urine Occurrence(s)  2 x 2 x Urine Output (mL) (Condom Catheter 10/15/18)  1250 1250 Stool Stool Occurrence(s)  2 x 2 x Shift Total 
(mL/kg)  1250 
(18.4) 1250 
(18.4) NET  -550 -550 Weight (kg) 74.1 68 68 68 68 68 Readmission Risk Assessment Tool Score High Risk   
      
 23 Total Score 3 Has Seen PCP in Last 6 Months (Yes=3, No=0)  
 5 Pt. Coverage (Medicare=5 , Medicaid, or Self-Pay=4) 15 Charlson Comorbidity Score (Age + Comorbid Conditions) Criteria that do not apply:  
 . Living with Significant Other. Assisted Living. LTAC. SNF. or  
Rehab Patient Length of Stay (>5 days = 3) IP Visits Last 12 Months (1-3=4, 4=9, >4=11) Expected Length of Stay 4d 19h Actual Length of Stay 3

## 2018-10-16 NOTE — PROGRESS NOTES
11: 23AM 
CM consult for hospice acknowledged. PC from Ino Lovelace at [facility] stating that family has chosen [facility] for hospice. Referral sent through [employer]. Waiting for response. 1:33PM 
PC from [facility] with [facility]. Able to accept pt. Plan for CM to arrange transportation for 10:00AM with [facility] to open ~10:45AM. [facility] to complete info session with dtr today. CM provided contact information. CM discussed with attending. Attending to review plan with pt's dtr. CM left VM for Ino Lovelace at [facility] updating on d/c time. Referral sent to [facility] requesting 10:00AM ride. 2:27PM 
[facility] transportation confirmed for tomorrow at 10:00AM. PCS on pt's chart. Nursing call report to [phone]. CM will continue to follow and assist with d/c planning. ALETA Bansal Care Manager

## 2018-10-16 NOTE — PROGRESS NOTES
Spiritual Care Partner Volunteer visited patient in PCU on October 16, 2016. Documented by: 
KIMBERLEE Salgado, [address],  [facility], [place]  Paging Service  [phone])

## 2018-10-16 NOTE — PROGRESS NOTES
Hospitalist Progress Note NAME: Naveen Cantu :  1931 MRN:  [id number] Assessment / Plan: 
Acute hypoxic respiratory failure POA Suspected Severe Sepsis from HCAP Pneumonia POA Bilateral Pleural Effusions; R>L POA Elevated Lactic Acid: 2.1; improved 
-reviewed CXR and CT Chest results 
-IVF's stopped on 10/13 with concern for pulmonary edema 
-TTE LVEF 20% 
-serial troponin up to 0.1 
-Pulmonary following; Thoracentesis canceled - IV zosyn 
-- IV diuretics -- Will D/W daughter re D/C planning -- transfer to tele 
-- Hospice at discharge, will reach out to daughter later today 
-- Not currently on O2 Acute Systolic Congestive Heart Failure (POA) Moderate to Severe Mitral Regurgitation POA Atrial Fibrillation/Flutter with RVR POA 
--IV lasix 
-will try to optimize patient's breathing while here but otherwise will plan of Hospice 
-Daughter, Monalisa John, plans to reach out to Hospice at the [facility] placed 
-Planning for possible discharge on Wednesday to [facility] with Hospice Hypotension on presentation: BP was 84/58 
-improved; pending BP's later today will consider IV diuretic 
  
History of Type II DM 
-continue SSI and check A1c 5.0 
  
Hypernatremia: mild 
-wnl today Advanced Alzheimer's dementia 
-cont namenda, aricept 
  
Constipation 
-miralax, soap suds enema Hyperlipidemia: 
-controlled with fibrate  
 
Code Status: DNR Surrogate Decision Maker: Daughter 
  
DVT Prophylaxis: sq heparin GI Prophylaxis: not indicated 
  
Baseline: Mostly Wheelchair bound at [facility]) Dispo: [facility] with Hospice; CHF will be patient's hospice diagnosis Subjective: Chief Complaint / Reason for Physician Visit: follow-up respiratory failure Patient Seen for follow-up Poor historian due to dementia, denied pain, mildly SOB Case discussed with RN Review of Systems: 
Symptom Y/N Comments  Symptom Y/N Comments Fever/Chills n   Chest Pain n   
Poor Appetite    Edema Cough    Abdominal Pain n   
Sputum    Joint Pain n   
SOB/GORDILLO y   Pruritis/Rash Nausea/vomit n   Tolerating PT/OT Diarrhea n   Tolerating Diet y Constipation    Other Could NOT obtain due to:   
 
Objective: VITALS:  
Last 24hrs VS reviewed since prior progress note. Most recent are: 
Patient Vitals for the past 24 hrs: 
 Temp Pulse Resp BP SpO2  
10/16/18 1145 97.6 °F (36.4 °C) (!) 112 17 124/79 95 % 10/16/18 0853 - (!) 113 - 99/65 -  
10/16/18 0800 97.7 °F (36.5 °C) (!) 114 18 99/65 95 % 10/16/18 0301 97.7 °F (36.5 °C) (!) 113 16 110/76 95 % 10/15/18 2306 97.3 °F (36.3 °C) (!) 111 16 105/74 90 % 10/15/18 2053 - (!) 111 - 107/78 -  
10/15/18 2000 97.2 °F (36.2 °C) (!) 112 16 108/79 94 % 10/15/18 1610 98 °F (36.7 °C) (!) 113 18 111/69 94 % Intake/Output Summary (Last 24 hours) at 10/16/18 1225 Last data filed at 10/16/18 0301 Gross per 24 hour Intake              700 ml Output             1250 ml Net             -550 ml PHYSICAL EXAM: 
General: WD, Elderly. Sedated, no acute distress  laying down EENT:  Pupils reactive. Anicteric sclerae. MM dry Resp:  No crackles or wheezing  No accessory muscle use CV:  Irregular Rhythm with mild tachycardia,  No edema GI:  Soft, Non distended, Non tender.  +Bowel sounds Neurologic:  Alert, able to tell me his birthday, but not birth year Knew he was in the hospital 
  Did not know the current year or president  
  follows simple commands, moves all extremities Psych:   Poor Insight. Not anxious nor agitated Skin:  No rashes noted. No jaundice Reviewed most current lab test results and cultures  YES Reviewed most current radiology test results   YES Review and summation of old records today    NO Reviewed patient's current orders and MAR    YES 
PMH/SH reviewed - no change compared to H&P 
 ________________________________________________________________________ Care Plan discussed with: 
  Comments Patient x Family  x   
RN x Care Manager Consultant  x Multidiciplinary team rounds were held today with , nursing, pharmacist and clinical coordinator. Patient's plan of care was discussed; medications were reviewed and discharge planning was addressed. ________________________________________________________________________ Total NON critical care TIME:  25   Minutes Total CRITICAL CARE TIME Spent:   Minutes non procedure based Comments >50% of visit spent in counseling and coordination of care x   
________________________________________________________________________ Dean Cheng MD  
 
Procedures: see electronic medical records for all procedures/Xrays and details which were not copied into this note but were reviewed prior to creation of Plan. LABS: 
I reviewed today's most current labs and imaging studies. Pertinent labs include: 
Recent Labs 10/16/18 
 0340  10/15/18 
 0506  10/14/18 
 6718 WBC  6.8  6.5  7.5 HGB  10.0*  9.7*  10.1* HCT  31.1*  30.3*  32.8*  
PLT  188  169  203 Recent Labs 10/16/18 
 0340  10/15/18 
 0506  10/14/18 
 1209 NA  144  144  146*  
K  3.3*  3.7  4.2 CL  108  111*  113* CO2  23  22  22 GLU  122*  87  104* BUN  36*  45*  47* CREA  1.18  1.28  1.21  
CA  8.4*  8.2*  8.1*  
MG   --   2.1  2.1 PHOS   --   3.6  4.7 ALB  3.0*  3.0*  3.1* TBILI  0.9  0.9  1.0  
SGOT  26  22  29 ALT  39  35  39 Signed: Dean Cheng MD

## 2018-10-16 NOTE — PROGRESS NOTES
TRANSFER - IN REPORT: 
 
Verbal report received from [facility]note 3[facility] Floor, RN(name) on Jaqueline Bell  being received from PCU(unit) for routine progression of care Report consisted of patients Situation, Background, Assessment and  
Recommendations(SBAR). Information from the following report(s) SBAR, Kardex, Procedure Summary, Intake/Output, MAR and Recent Results was reviewed with the receiving nurse. Opportunity for questions and clarification was provided. Assessment completed upon patients arrival to unit and care assumed.

## 2018-10-16 NOTE — PROGRESS NOTES
Bedside shift change report given to [address] (oncoming nurse) by Kika Fields (offgoing nurse). Report included the following information SBAR, Kardex, ED Summary, Procedure Summary and [facility] No acute events. Opportunity for questions and clarification provided.

## 2018-10-17 VITALS
WEIGHT: 149.91 LBS | HEART RATE: 113 BPM | OXYGEN SATURATION: 97 % | BODY MASS INDEX: 19.24 KG/M2 | SYSTOLIC BLOOD PRESSURE: 114 MMHG | TEMPERATURE: 97.5 F | RESPIRATION RATE: 18 BRPM | DIASTOLIC BLOOD PRESSURE: 84 MMHG | HEIGHT: 74 IN

## 2018-10-17 LAB
ANION GAP SERPL CALC-SCNC: 10 MMOL/L (ref 5–15)
BACTERIA SPEC CULT: NORMAL
BASOPHILS # BLD: 0 K/UL (ref 0–0.1)
BASOPHILS NFR BLD: 0 % (ref 0–1)
BUN SERPL-MCNC: 27 MG/DL (ref 6–20)
BUN/CREAT SERPL: 24 (ref 12–20)
CALCIUM SERPL-MCNC: 8.1 MG/DL (ref 8.5–10.1)
CHLORIDE SERPL-SCNC: 106 MMOL/L (ref 97–108)
CO2 SERPL-SCNC: 28 MMOL/L (ref 21–32)
CREAT SERPL-MCNC: 1.12 MG/DL (ref 0.7–1.3)
DIFFERENTIAL METHOD BLD: ABNORMAL
EOSINOPHIL # BLD: 0.1 K/UL (ref 0–0.4)
EOSINOPHIL NFR BLD: 1 % (ref 0–7)
ERYTHROCYTE [DISTWIDTH] IN BLOOD BY AUTOMATED COUNT: 16 % (ref 11.5–14.5)
GLUCOSE BLD STRIP.AUTO-MCNC: 107 MG/DL (ref 65–100)
GLUCOSE BLD STRIP.AUTO-MCNC: 170 MG/DL (ref 65–100)
GLUCOSE SERPL-MCNC: 99 MG/DL (ref 65–100)
HCT VFR BLD AUTO: 32.2 % (ref 36.6–50.3)
HGB BLD-MCNC: 10.5 G/DL (ref 12.1–17)
IMM GRANULOCYTES # BLD: 0 K/UL (ref 0–0.04)
IMM GRANULOCYTES NFR BLD AUTO: 0 % (ref 0–0.5)
LYMPHOCYTES # BLD: 0.4 K/UL (ref 0.8–3.5)
LYMPHOCYTES NFR BLD: 5 % (ref 12–49)
MCH RBC QN AUTO: 30.5 PG (ref 26–34)
MCHC RBC AUTO-ENTMCNC: 32.6 G/DL (ref 30–36.5)
MCV RBC AUTO: 93.6 FL (ref 80–99)
MONOCYTES # BLD: 0.6 K/UL (ref 0–1)
MONOCYTES NFR BLD: 9 % (ref 5–13)
NEUTS SEG # BLD: 5.8 K/UL (ref 1.8–8)
NEUTS SEG NFR BLD: 85 % (ref 32–75)
NRBC # BLD: 0 K/UL (ref 0–0.01)
NRBC BLD-RTO: 0 PER 100 WBC
PLATELET # BLD AUTO: 173 K/UL (ref 150–400)
PMV BLD AUTO: 10.1 FL (ref 8.9–12.9)
POTASSIUM SERPL-SCNC: 3 MMOL/L (ref 3.5–5.1)
RBC # BLD AUTO: 3.44 M/UL (ref 4.1–5.7)
SERVICE CMNT-IMP: ABNORMAL
SERVICE CMNT-IMP: ABNORMAL
SERVICE CMNT-IMP: NORMAL
SODIUM SERPL-SCNC: 144 MMOL/L (ref 136–145)
WBC # BLD AUTO: 6.9 K/UL (ref 4.1–11.1)

## 2018-10-17 PROCEDURE — 36415 COLL VENOUS BLD VENIPUNCTURE: CPT | Performed by: INTERNAL MEDICINE

## 2018-10-17 PROCEDURE — 74011000258 HC RX REV CODE- 258: Performed by: INTERNAL MEDICINE

## 2018-10-17 PROCEDURE — 74011250637 HC RX REV CODE- 250/637: Performed by: INTERNAL MEDICINE

## 2018-10-17 PROCEDURE — 82962 GLUCOSE BLOOD TEST: CPT

## 2018-10-17 PROCEDURE — 90471 IMMUNIZATION ADMIN: CPT

## 2018-10-17 PROCEDURE — 74011000250 HC RX REV CODE- 250: Performed by: INTERNAL MEDICINE

## 2018-10-17 PROCEDURE — 77030037878 HC DRSG MEPILEX >48IN BORD MOLN -B

## 2018-10-17 PROCEDURE — 74011250636 HC RX REV CODE- 250/636: Performed by: INTERNAL MEDICINE

## 2018-10-17 PROCEDURE — 85025 COMPLETE CBC W/AUTO DIFF WBC: CPT | Performed by: INTERNAL MEDICINE

## 2018-10-17 PROCEDURE — 90686 IIV4 VACC NO PRSV 0.5 ML IM: CPT | Performed by: INTERNAL MEDICINE

## 2018-10-17 PROCEDURE — 80048 BASIC METABOLIC PNL TOTAL CA: CPT | Performed by: INTERNAL MEDICINE

## 2018-10-17 RX ORDER — FUROSEMIDE 20 MG/1
20 TABLET ORAL DAILY
Status: DISCONTINUED | OUTPATIENT
Start: 2018-10-17 | End: 2018-10-17 | Stop reason: HOSPADM

## 2018-10-17 RX ORDER — AMOXICILLIN AND CLAVULANATE POTASSIUM 500; 125 MG/1; MG/1
1 TABLET, FILM COATED ORAL 2 TIMES DAILY WITH MEALS
Qty: 6 TAB | Refills: 0 | Status: SHIPPED | OUTPATIENT
Start: 2018-10-17 | End: 2018-10-20

## 2018-10-17 RX ORDER — AMOXICILLIN AND CLAVULANATE POTASSIUM 500; 125 MG/1; MG/1
1 TABLET, FILM COATED ORAL EVERY 8 HOURS
Status: DISCONTINUED | OUTPATIENT
Start: 2018-10-17 | End: 2018-10-17 | Stop reason: HOSPADM

## 2018-10-17 RX ORDER — POTASSIUM CHLORIDE 750 MG/1
20 TABLET, FILM COATED, EXTENDED RELEASE ORAL 2 TIMES DAILY
Status: DISCONTINUED | OUTPATIENT
Start: 2018-10-17 | End: 2018-10-17 | Stop reason: HOSPADM

## 2018-10-17 RX ORDER — POTASSIUM CHLORIDE 1500 MG/1
20 TABLET, FILM COATED, EXTENDED RELEASE ORAL 2 TIMES DAILY
Qty: 60 TAB | Refills: 0 | Status: SHIPPED
Start: 2018-10-17

## 2018-10-17 RX ORDER — FUROSEMIDE 20 MG/1
20 TABLET ORAL DAILY
Qty: 30 TAB | Refills: 3 | Status: SHIPPED | OUTPATIENT
Start: 2018-10-17

## 2018-10-17 RX ADMIN — POLYETHYLENE GLYCOL 3350 17 G: 17 POWDER, FOR SOLUTION ORAL at 09:46

## 2018-10-17 RX ADMIN — INFLUENZA VIRUS VACCINE 0.5 ML: 15; 15; 15; 15 SUSPENSION INTRAMUSCULAR at 10:22

## 2018-10-17 RX ADMIN — DONEPEZIL HYDROCHLORIDE 10 MG: 5 TABLET, FILM COATED ORAL at 09:47

## 2018-10-17 RX ADMIN — CILOSTAZOL 100 MG: 100 TABLET ORAL at 09:47

## 2018-10-17 RX ADMIN — CITALOPRAM HYDROBROMIDE 20 MG: 20 TABLET ORAL at 09:46

## 2018-10-17 RX ADMIN — MEMANTINE 10 MG: 10 TABLET ORAL at 09:50

## 2018-10-17 RX ADMIN — CLOPIDOGREL BISULFATE 75 MG: 75 TABLET ORAL at 09:47

## 2018-10-17 RX ADMIN — PIPERACILLIN SODIUM,TAZOBACTAM SODIUM 4.5 G: 4; .5 INJECTION, POWDER, FOR SOLUTION INTRAVENOUS at 09:51

## 2018-10-17 RX ADMIN — FENOFIBRATE 48 MG: 48 TABLET ORAL at 09:48

## 2018-10-17 NOTE — PROGRESS NOTES
Anticipate [facility] transportation to transport pt to [facility] at [address] with [facility]. PCS on pt's chart. Nursing call report to [phone], send emar, d/c instructions, facesheet, ambulance form, Rx, and completed DDNR.   Thanks.

## 2018-10-17 NOTE — PROGRESS NOTES
Problem: Falls - Risk of 
Goal: *Absence of Falls Document Ash Rothman Fall Risk and appropriate interventions in the flowsheet. Outcome: Progressing Towards Goal 
Fall Risk Interventions: 
Mobility Interventions: Bed/chair exit alarm, Communicate number of staff needed for ambulation/transfer, Strengthening exercises (ROM-active/passive) Mentation Interventions: Bed/chair exit alarm, Door open when patient unattended, Room close to nurse's station, Toileting rounds, More frequent rounding, Reorient patient Medication Interventions: Bed/chair exit alarm, Evaluate medications/consider consulting pharmacy Elimination Interventions: Bed/chair exit alarm, Call light in reach, Toileting schedule/hourly rounds

## 2018-10-17 NOTE — DISCHARGE SUMMARY
Hospitalist Discharge Note    NAME: Mathew Crews   :  1931   MRN:  [id number]     Admit date: 10/12/2018    Discharge date: 10/17/18    PCP: Jailene Stewart MD    Discharge Diagnoses:    Acute hypoxic respiratory failure POA now weaned off O2    Suspected Severe Sepsis from HCAP Pneumonia POA    Bilateral Pleural Effusions; R>L POA    Elevated Lactic Acid: 2.1; improved     Acute Systolic Congestive Heart Failure (POA)    Moderate to Severe Mitral Regurgitation POA    Atrial Fibrillation/Flutter with RVR POA     Hypotension POA BP 84/58     History of Type II DM type 2 A1c 5.0     Hypernatremia POA     Advanced Alzheimer's dementia POA     Hyperlipidemia POA     Code Status: DNR    Surrogate Decision Maker: Daughter    Recommended diet: mechanical soft, low salt    Glucose management:  Accucheck ACHS with sliding scale per SNF protocol    Code status: DNR    Hospice/comfort Care at SNF    Discharge Medications:  Current Discharge Medication List      START taking these medications    Details   amoxicillin-clavulanate (AUGMENTIN) 500-125 mg per tablet Take 1 Tab by mouth two (2) times daily (with meals) for 3 days. Qty: 6 Tab, Refills: 0      furosemide (LASIX) 20 mg tablet Take 1 Tab by mouth daily. Qty: 30 Tab, Refills: 3      potassium chloride SR (K-TAB) 20 mEq tablet Take 1 Tab by mouth two (2) times a day. Qty: 60 Tab, Refills: 0         CONTINUE these medications which have NOT CHANGED    Details   citalopram (CELEXA) 20 mg tablet Take 20 mg by mouth daily. clopidogrel (PLAVIX) 75 mg tablet Take 1 Tab by mouth daily. Qty: 30 Tab, Refills: 5      memantine (NAMENDA) 10 mg tablet Take 10 mg by mouth two (2) times a day. fenofibrate (TRICOR) 54 mg tablet Take 160 mg by mouth daily. cilostazol (PLETAL) 100 mg tablet Take 100 mg by mouth Before breakfast and dinner. donepezil (ARICEPT) 10 mg tablet Take 10 mg by mouth daily.       colesevelam (WELCHOL) 625 mg tablet Take 1,875 mg by mouth two (2) times a day. STOP taking these medications       amLODIPine (NORVASC) 5 mg tablet Comments:   Reason for Stopping:         NPH, HUMAN INSULIN ISOPHANE (HUMULIN N SC) Comments:   Reason for Stopping:         lisinopril (PRINIVIL, ZESTRIL) 20 mg tablet Comments:   Reason for Stopping: Follow-up Information     Follow up With Specialties Details Why Contact Info    Hermila Guan MD Internal Medicine Schedule an appointment as soon as possible for a visit in 2 weeks  [address] [place] [address]  [phone]            Time spent on discharge:   I spent greater than 30 minutes on discharge, seeing and examining the patient, reconciling home meds and new meds, coordinating care with case management, doing the discharge papers and the D/C summary    Discharge disposition: D/C SNF    Discharge Condition: Stable    Summary of admission H+P(copied from Dr Seng Jacinto Note):     CHIEF COMPLAINT: increased work of breathing, abnormal chest Xray     HISTORY OF PRESENT ILLNESS:     Bhaskar Faustin is a 80 y.o. male with PMHx of CAD, HTN, advanced Alzheimer's dementia, brought by EMS from an [facility] with report of increased work of breathing and an abnormal CXR, which was performed at the residence. Per EMS notes, patient was hypoxic with sats in 80s prior to arrival. The patient cannot provide any history, which is therefore obtained by review of the medical record, EMS documentation and ER staff/notes, and speaking by phone with the patient's daughter. Per the daughter, the patient is bed and wheelchair bound where he resides and the medical record reveals dementia dating back to at least 2012.  Per the daughter, the patient has experienced significant decline in function over the past year.      We were asked to admit for work up and evaluation of the above problems.           Past Medical History:   Diagnosis Date    CAD (coronary artery disease) 2000     MI    Coagulation defects       bruises easily    COPD      DEMENTIA      Diabetes ([facility] [address]       IDDM    Hypertension      Other ill-defined conditions(799.89)       increased cholesterol    Unspecified cerebral artery occlusion with cerebral infarction [facility])          Hospital course:     Acute hypoxic respiratory failure POA  Suspected Severe Sepsis from HCAP Pneumonia POA  Bilateral Pleural Effusions; R>L POA  Elevated Lactic Acid: 2.1; improved  - reviewed CXR and CT Chest results  - IVF's stopped on 10/13 with concern for pulmonary edema  - TTE LVEF 20%  - serial troponin up to 0.1  - Pulmonary following;  Thoracentesis canceled   - IV zosyn change to PO antibiotics  -- IV diuretics  -- Will D/W daughter re D/C planning  -- transfer to tele  -- Hospice at discharge, spoke with daughter by phone, they are in agreement with hospice  -- Not currently on O2    Acute Systolic Congestive Heart Failure (POA)  Moderate to Severe Mitral Regurgitation POA  Atrial Fibrillation/Flutter with RVR POA  --IV lasix  -will try to optimize patient's breathing while here but otherwise will plan of Hospice  -Daughter, Andrea Duran, plans to reach out to Hospice at the [id number] [facility] order placed  -Planning for possible discharge on Wednesday to [facility] with Hospice    Hypotension on presentation: BP was 84/58  -improved; pending BP's later today will consider IV diuretic     History of Type II DM  -continue SSI and check A1c 5.0     Hypernatremia: mild  -wnl today    Advanced Alzheimer's dementia  -cont namenda, aricept     Constipation  -miralax, soap suds enema    Hyperlipidemia:  -controlled with fibrate     Code Status: DNR  Surrogate Decision Maker: Daughter     DVT Prophylaxis: sq heparin  GI Prophylaxis: not indicated     Baseline[place] Most[place] [facility]      Dispo: [facility] with Hospice; CHF will be patient's hospice diagnosis     Subjective:     Chief Complaint / Reason for Physician Visit: follow-up respiratory failure  Patient Seen for follow-up  Poor historian due to dementia, denied pain, mildly SOB  Case discussed with RN    Review of Systems:  Symptom Y/N Comments  Symptom Y/N Comments   Fever/Chills n   Chest Pain n    Poor Appetite    Edema     Cough    Abdominal Pain n    Sputum    Joint Pain n    SOB/GORDILLO y   Pruritis/Rash     Nausea/vomit n   Tolerating PT/OT     Diarrhea n   Tolerating Diet y    Constipation    Other       Could NOT obtain due to:      Objective:     VITALS:   Last 24hrs VS reviewed since prior progress note. Most recent are:  Patient Vitals for the past 24 hrs:   Temp Pulse Resp BP SpO2   10/17/18 1012 97.5 °F (36.4 °C) (!) 113 18 114/84 97 %   10/17/18 0725 97.8 °F (36.6 °C) (!) 114 18 109/74    10/17/18 0300 97.6 °F (36.4 °C) 94 20 99/72 94 %   10/16/18 2300 97.6 °F (36.4 °C) 94 19 120/68 95 %   10/16/18 2030 98 °F (36.7 °C) 100 18 112/76 97 %   10/16/18 1741 97.7 °F (36.5 °C) (!) 115 18 110/74 96 %   10/16/18 1600 98 °F (36.7 °C) (!) 115 18 95/61 93 %   10/16/18 1400  (!) 113  103/68 92 %   10/16/18 1145 97.6 °F (36.4 °C) (!) 112 17 124/79 95 %       Intake/Output Summary (Last 24 hours) at 10/17/2018 1026  Last data filed at 10/17/2018 0817  Gross per 24 hour   Intake 100 ml   Output 2450 ml   Net -2350 ml        PHYSICAL EXAM:  General: WD, Elderly. Sedated, no acute distress  laying down  EENT:  Pupils reactive. Anicteric sclerae. MM dry  Resp:  No crackles or wheezing  No accessory muscle use  CV:  Irregular Rhythm with mild tachycardia,  No edema  GI:  Soft, Non distended, Non tender.  +Bowel sounds  Neurologic:  Alert, able to tell me his birthday, but not birth year    Knew he was in the hospital    Did not know the current year or president     follows simple commands, moves all extremities  Psych:   Poor Insight. Not anxious nor agitated  Skin:  No rashes noted.   No jaundice    Reviewed most current lab test results and cultures YES  Reviewed most current radiology test results   YES  Review and summation of old records today    NO  Reviewed patient's current orders and MAR    YES  PMH/SH reviewed - no change compared to H&P  ________________________________________________________________________  Care Plan discussed with:    Comments   Patient x    Family  x Daughter by phone   RN x    Care Manager     Consultant  x                      Multidiciplinary team rounds were held today with , nursing, pharmacist and clinical coordinator. Patient's plan of care was discussed; medications were reviewed and discharge planning was addressed. ________________________________________________________________________      Comments   >50% of visit spent in counseling and coordination of care x    ________________________________________________________________________  Sakshi Stewart MD     Procedures: see electronic medical records for all procedures/Xrays and details which were not copied into this note but were reviewed prior to creation of Plan. LABS:  I reviewed today's most current labs and imaging studies.   Pertinent labs include:  Recent Labs     10/17/18  0309 10/16/18  0340 10/15/18  0506   WBC 6.9 6.8 6.5   HGB 10.5* 10.0* 9.7*   HCT 32.2* 31.1* 30.3*    188 169     Recent Labs     10/17/18  0309 10/16/18  0340 10/15/18  0506    144 144   K 3.0* 3.3* 3.7    108 111*   CO2 28 23 22   GLU 99 122* 87   BUN 27* 36* 45*   CREA 1.12 1.18 1.28   CA 8.1* 8.4* 8.2*   MG  --   --  2.1   PHOS  --   --  3.6   ALB  --  3.0* 3.0*   TBILI  --  0.9 0.9   SGOT  --  26 22   ALT  --  39 35       Signed: Sakshi Stewart MD

## 2018-10-17 NOTE — PROGRESS NOTES
Problem: Falls - Risk of 
Goal: *Absence of Falls Document William Mcgowan Fall Risk and appropriate interventions in the flowsheet. Outcome: Progressing Towards Goal 
Fall Risk Interventions: 
Mobility Interventions: Bed/chair exit alarm, Communicate number of staff needed for ambulation/transfer, Strengthening exercises (ROM-active/passive) Mentation Interventions: Bed/chair exit alarm, Door open when patient unattended, Room close to nurse's station, Toileting rounds, More frequent rounding, Reorient patient Medication Interventions: Bed/chair exit alarm, Evaluate medications/consider consulting pharmacy Elimination Interventions: Bed/chair exit alarm, Call light in reach, Toileting schedule/hourly rounds

## 2018-10-17 NOTE — DISCHARGE INSTRUCTIONS
HOSPITALIST DISCHARGE INSTRUCTIONS    NAME: Dionne Dyson   :  1931   MRN:  [id number]     Date/Time:  10/17/2018 10:21 AM    ADMIT DATE: 10/12/2018   DISCHARGE DATE: 10/17/2018     Attending Physician: Sana Burger MD    DISCHARGE DIAGNOSIS:    Acute hypoxic respiratory failure POA now weaned off O2  Suspected Severe Sepsis from HCAP Pneumonia POA  Bilateral Pleural Effusions; R>L POA  Elevated Lactic Acid: 2.1; improved  - TTE LVEF 20%  - serial troponin up to 0.1  - Pulmonary following; Thoracentesis canceled   - IV zosyn change to Po augmentin to complete course  -- IV diuretics to Po lasix  -- held ACE and coreg with hypotension and hospice care  -- Not currently on O2  -- Completer antibiotics xand Po lasox  -- D/W daughter by phone     Acute Systolic Congestive Heart Failure (POA)  Moderate to Severe Mitral Regurgitation POA  Atrial Fibrillation/Flutter with RVR POA  --IV lasix to Po lasix  - Hospice     Hypotension on presentation: BP was 84/58  BP meds on hold at discharge     History of Type II DM  Continue SSI and check A1c 5.0     Hypernatremia: mild     Advanced Alzheimer's dementia  -cont namenda, aricept     Hyperlipidemia:  -controlled with fibrate      Code Status: DNR  Surrogate Decision Maker: Daughter      Medications: Per above medication reconciliation. Pain Management: per above medications    Recommended diet: mechanical soft, low salt    Recommended activity: Activity as tolerated    Wound care: None    Indwelling devices:  None    Supplemental Oxygen: None    Required Lab work: Per SNF routine    Glucose management:  Accucheck ACHS with sliding scale per SNF protocol    Code status: DNR    Hospice/comfort Care at [facility]    Outside physician follow up:     Follow-up Information     Follow up With Specialties Details Why Maryam Rose MD Internal Medicine Schedule an appointment as soon as possible for a visit in 2 weeks  [address] [place]  [phone]                 Skilled nursing facility/ SNF MD responsible for above on discharge. Information obtained by :  I understand that if any problems occur once I am at home I am to contact my physician. I understand and acknowledge receipt of the instructions indicated above.                                                                                                                                            Physician's or R.N.'s Signature                                                                  Date/Time                                                                                                                                              Patient or Repres

## 2018-10-17 NOTE — PROGRESS NOTES
Primary RN Abbie and secondary RN Suhail Mackenzie performed dual skin assessment on this patient. Following are noted-non-blanchable redness to buttocks, blanchable redness to bilateral heles. Theodore score is 11. Bedside shift change report given to Suhail Mackenzie RN (oncoming nurse) by Kim Moreno RN (offgoing nurse). Report included the following information Kardex, ED Summary, Procedure Summary and Recent Results.

## 2018-10-17 NOTE — PROGRESS NOTES
10/16/2018 8:04 PM 
 
Admit Date: 10/12/2018 Admit Diagnosis:  
Sepsis ([facility] [address] Subjective:  
 
You Liriano Is confused, trying to get out of bed, but voices no complaints. He cannot respond appropriately to any questions. Current Facility-Administered Medications Medication Dose Route Frequency  furosemide (LASIX) injection 40 mg  40 mg IntraVENous DAILY  influenza vaccine 2018-19 (6 mos+)(PF) (FLUARIX QUAD/FLULAVAL QUAD) injection 0.5 mL  0.5 mL IntraMUSCular PRIOR TO DISCHARGE  citalopram (CELEXA) tablet 20 mg  20 mg Oral DAILY  clopidogrel (PLAVIX) tablet 75 mg  75 mg Oral DAILY  cilostazol (PLETAL) tablet 100 mg  100 mg Oral ACB&D  
 fenofibrate nanocrystallized (TRICOR) tablet 48 mg  48 mg Oral DAILY  donepezil (ARICEPT) tablet 10 mg  10 mg Oral DAILY  memantine (NAMENDA) tablet 10 mg  10 mg Oral BID  
 glucose chewable tablet 16 g  4 Tab Oral PRN  
 dextrose (D50W) injection syrg 12.5-25 g  25-50 mL IntraVENous PRN  
 glucagon (GLUCAGEN) injection 1 mg  1 mg IntraMUSCular PRN  
 insulin lispro (HUMALOG) injection   SubCUTAneous AC&HS  polyethylene glycol (MIRALAX) packet 17 g  17 g Oral DAILY  piperacillin-tazobactam (ZOSYN) 4.5 g in 0.9% sodium chloride (MBP/ADV) 100 mL  4.5 g IntraVENous Q8H Objective:  
  
Physical Exam:   
Visit Vitals  /74 (BP 1 Location: Right arm, BP Patient Position: At rest)  Pulse (!) 115 Comment: Notified RN  
 Temp 97.7 °F (36.5 °C)  Resp 18  Ht 6' 2\" (1.88 m)  Wt 149 lb 14.6 oz (68 kg)  SpO2 96%  BMI 19.25 kg/m2 Gen: Confused, trying to get out of bed. Chest and Lung Exam  
Inspection: Accessory muscles - No use of accessory muscles in breathing. Auscultation:  
Breath sounds: -Decreased on the right Cardiovascular Inspection: Jugular vein - Bilateral - Inspection Normal.  
Palpation/Percussion:  
Apical Impulse: - Normal.  
 Auscultation: Rhythm -regular, slightly tachycardic. Heart Sounds - S1 WNL and S2 WNL. No S3 or S4. Murmurs & Other Heart Sounds: Auscultation of the heart reveals - No Murmurs. Peripheral Vascular Upper Extremity: Inspection - Bilateral - No Cyanotic nailbeds or Digital clubbing. Lower Extremity:  
Palpation: Edema - Bilateral - No edema. Abdomen:   Soft, non-tender, bowel sounds are active. Neuro: A&O times 3, CN and motor grossly WNL Data Review:  
Recent Labs 10/16/18 
 0340  10/15/18 
 0506  10/14/18 
 7385 WBC  6.8  6.5  7.5 HGB  10.0*  9.7*  10.1* HCT  31.1*  30.3*  32.8*  
PLT  188  169  203 Recent Labs 10/16/18 
 0340  10/15/18 
 0506  10/14/18 
 8877 NA  144  144  146*  
K  3.3*  3.7  4.2 CL  108  111*  113* CO2  23  22  22 GLU  122*  87  104* BUN  36*  45*  47* CREA  1.18  1.28  1.21  
CA  8.4*  8.2*  8.1*  
MG   --   2.1  2.1 PHOS   --   3.6  4.7 ALB  3.0*  3.0*  3.1* TBILI  0.9  0.9  1.0  
SGOT  26  22  29 ALT  39  35  39 Recent Labs 10/14/18 
 4990 TROIQ  0.10* Intake/Output Summary (Last 24 hours) at 10/16/18 2004 Last data filed at 10/16/18 0301 Gross per 24 hour Intake              700 ml Output              525 ml Net              175 ml Telemetry: Probable atypical atrial flutter Assessment:  
 
Active Problems: COPD with acute bronchitis ([facility] [address] (4/9/2011) Coronary atherosclerosis of native coronary artery (4/9/2011) DM (diabetes mellitus) ([facility] [address] (4/9/2011) Dementia (4/9/2011) DNR (do not resuscitate) (12/30/2012) HTN (hypertension) (12/30/2012) Sepsis ([facility] [address] (10/13/2018) H/O: CVA (cerebrovascular accident) (10/13/2018) CAD (coronary artery disease) (1/1/2000) Overview: Reported MI 2000 Plan:  
 
Probable atypical A. Flutter: 
May be small flutter ways TSH normal, Mg and K+ stable, 
Continue on Plavix, no BB due to hypotension If flutter, CHADS2 vasc score high but not a candidate for long term DOAC - fall risk 
  
Bilateral Pleural Effusions; R>L- per IM, continue diuresing 
  
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 
Diurese, no I/Os, daily weights- lasix added with parameters to hold Hold BB/ ACEI with sepsis and borderline low BP, continue diuresis 
  
Advanced dementia Long and short-term prognosis is severely guarded.   
Noted plans for discharge to hospice. I will sign off for now. I appreciate the opportunity to participate in this patient's care. Please call if I can assist in the future.

## 2018-10-17 NOTE — PROGRESS NOTES
Hospital to [facility] SBAR Handoff - Starlene Shows 
                                                                      80 y.o.   male [address]   Room: Formerly named [facility] 3 MED TELE  Unit Phone# :  [phone] [address] 
[facility] [address] [address] Dept: [phone] Loc: [id number] SITUATION Admitted:  10/12/2018         Attending Provider:  Chayito Courtney MD    
 
Consultations:  IP CONSULT TO CARDIOLOGY 
IP CONSULT TO INTERVENTIONAL RADIOLOGY 
IP CONSULT TO INTERVENTIONAL RADIOLOGY 
IP CONSULT TO PULMONOLOGY 
 
PCP:  Kenneth Matthew MD   [phone] Treatment Team: Attending Provider: Chayito Courtney MD; Care Manager: Vaibhav Cristina Admitting Dx:  Sepsis ([facility] [address] Principal Problem: <principal problem not specified> * No surgery found * of  
  
BY: * Surgery not found *             ON: * No surgery found * Code Status: DNR Advance Directives:  
Advance Care Planning 10/14/2018 Patient's Healthcare Decision Maker is: Legal Next of Kin Confirm Advance Directive None (Send w/patient) Not Received Isolation:  There are currently no Active Isolations       MDRO: No current active infections Pain Medications given:  no    
 
Special Equipment needed: yes BACKGROUND Allergies: Allergies Allergen Reactions  Macrolide Antibiotics Unknown (comments)  Statins-Hmg-Coa Reductase Inhibitors Myalgia  Vitamin B12 With Intrinsic Factor Other (comments)  Zetia [Ezetimibe] Shortness of Breath Past Medical History:  
Diagnosis Date  CAD (coronary artery disease) 2000 MI  Coagulation defects   
 bruises easily  COPD  Dementia  Diabetes ([facility] [address] IDDM  Hypertension  Other ill-defined conditions(799.89)   
 increased cholesterol  Unspecified cerebral artery occlusion with cerebral infarction Past Surgical History:  
Procedure Laterality Date  CARDIAC SURG PROCEDURE UNLIST  2005  
 cardiac cath - heart stent x ?3  HX APPENDECTOMY  HX TONSILLECTOMY  HX UROLOGICAL  1990  
 prostate - removal of polyp Medications Prior to Admission Medication Sig  
 citalopram (CELEXA) 20 mg tablet Take 20 mg by mouth daily.  amLODIPine (NORVASC) 5 mg tablet Take 1 Tab by mouth daily.  clopidogrel (PLAVIX) 75 mg tablet Take 1 Tab by mouth daily.  memantine (NAMENDA) 10 mg tablet Take 10 mg by mouth two (2) times a day.  fenofibrate (TRICOR) 54 mg tablet Take 160 mg by mouth daily.  cilostazol (PLETAL) 100 mg tablet Take 100 mg by mouth Before breakfast and dinner.  NPH, HUMAN INSULIN ISOPHANE (HUMULIN N SC) 30 Units by SubCUTAneous route two (2) times a day.  donepezil (ARICEPT) 10 mg tablet Take 10 mg by mouth daily.  lisinopril (PRINIVIL, ZESTRIL) 20 mg tablet Take 20 mg by mouth daily.  colesevelam (WELCHOL) 625 mg tablet Take 1,875 mg by mouth two (2) times a day. Hard scripts included in transfer packet yes Vaccinations:   
Immunization History Administered Date(s) Administered  Influenza Vaccine Whole 09/30/2010  ZZZ-RETIRED (DO NOT USE) Pneumococcal Vaccine (Unspecified Type) 09/30/2010 Readmission Risks:    Known Risks: Fall The Charlson CoMorbitiy Index tool is an evidenced based tool that has more automatic generated information. The tool looks at many different items such as the age of the patient, how many times they were admitted in the last calendar year, current length of stay in the hospital and their diagnosis. All of these items are pulled automatically from information documented in the chart from various places and will generate a score that predicts whether a patient is at low (less than 13), medium (13-20) or high (21 or greater) risk of being readmitted. ASSESSMENT Temp: 97.8 °F (36.6 °C) (10/17/18 0725) Pulse (Heart Rate): (!) 114 (10/17/18 0725) Resp Rate: 18 (10/17/18 0725)           BP: 109/74 (10/17/18 0725) O2 Sat (%): 94 % (10/17/18 0300) Weight: 68 kg (149 lb 14.6 oz)    Height: 6' 2\" (188 cm) (10/16/18 0645) If above not within 1 hour of discharge: 
 
BP:_____  P:____  R:____ T:_____ O2 Sat: ___%  O2: ______ Active Orders Diet DIET MECHANICAL SOFT Orientation: only aware of  person Active Behaviors: None Active Lines/Drains:  (Peg Tube / Grier / CL or S/L?): no 
 
Urinary Status: Voiding, External catheter     Last BM: Last Bowel Movement Date: 10/17/18 Skin Integrity: Excoriation Mobility: Very limited Weight Bearing Status: WBAT (Weight Bearing as Tolerated) Lab Results Component Value Date/Time Glucose 99 10/17/2018 03:09 AM  
 Hemoglobin A1c 5.0 10/14/2018 03:24 AM  
 INR 1.1 10/12/2018 05:38 PM  
 INR 1.1 12/29/2012 05:00 PM  
 HGB 10.5 (L) 10/17/2018 03:09 AM  
 HGB 10.0 (L) 10/16/2018 03:40 AM  
  
  RECOMMENDATION See After Visit Summary (AVS) for: · Discharge instructions · [facility] · Special equipment needed (entered pre-discharge by Care Management) · Medication Reconciliation · Follow up Appointment(s) Report given/sent by:  Mynor Gonzalez Verbal report given to: Be  FAXED to:  [facility] Estimated discharge time:  10/17/2018 at 1000

## 2018-10-17 NOTE — PROGRESS NOTES
Bedside and Verbal shift change report given to Abbie (oncoming nurse) by Evelyn Mcallister (offgoing nurse). Report included the following information SBAR, Kardex, Intake/Output, MAR and Recent Results.